# Patient Record
Sex: FEMALE | Race: BLACK OR AFRICAN AMERICAN | NOT HISPANIC OR LATINO | Employment: STUDENT | URBAN - METROPOLITAN AREA
[De-identification: names, ages, dates, MRNs, and addresses within clinical notes are randomized per-mention and may not be internally consistent; named-entity substitution may affect disease eponyms.]

---

## 2018-04-03 ENCOUNTER — TELEPHONE (OUTPATIENT)
Dept: OBSTETRICS AND GYNECOLOGY | Facility: CLINIC | Age: 28
End: 2018-04-03

## 2018-04-03 NOTE — TELEPHONE ENCOUNTER
----- Message from Kate Dominguez sent at 4/3/2018  2:41 PM CDT -----    Patient name:xiang      Reason:pt. Would like to establish care with dr. Alonzo. Pt. Would like to schedule first initial ob visit.[facility] 02/23/18. Please call to discuss      Callback number:[phone]

## 2018-04-03 NOTE — TELEPHONE ENCOUNTER
Pt was informed that we are not excepting any new ob at this time and we can get her scheduled at the [facility]

## 2018-11-21 LAB — HIV1/HIV2 ANTIBODY: NEGATIVE

## 2020-03-20 ENCOUNTER — PATIENT OUTREACH (OUTPATIENT)
Dept: ADMINISTRATIVE | Facility: HOSPITAL | Age: 30
End: 2020-03-20

## 2020-03-20 NOTE — PROGRESS NOTES
Pre-visit chart review completed.  Immunizations reviewed and updated.    Patient due for the following    Lipid Panel     HIV Screening     TETANUS VACCINE     Pap Smear with HPV Cotest     Influenza Vaccine (1)      Patient new to provider.

## 2020-05-29 ENCOUNTER — PATIENT OUTREACH (OUTPATIENT)
Dept: ADMINISTRATIVE | Facility: HOSPITAL | Age: 30
End: 2020-05-29

## 2020-05-29 NOTE — PROGRESS NOTES
Pre-visit chart review completed.  Immunizations reviewed and updated.    Patient due for the following    Lipid Panel     TETANUS VACCINE     Pap Smear with HPV Cotest       Patient new to provider.

## 2020-06-12 ENCOUNTER — LAB VISIT (OUTPATIENT)
Dept: LAB | Facility: HOSPITAL | Age: 30
End: 2020-06-12
Attending: INTERNAL MEDICINE
Payer: COMMERCIAL

## 2020-06-12 ENCOUNTER — OFFICE VISIT (OUTPATIENT)
Dept: PRIMARY CARE CLINIC | Facility: CLINIC | Age: 30
End: 2020-06-12
Payer: COMMERCIAL

## 2020-06-12 VITALS
HEIGHT: 68 IN | OXYGEN SATURATION: 99 % | DIASTOLIC BLOOD PRESSURE: 70 MMHG | HEART RATE: 60 BPM | RESPIRATION RATE: 19 BRPM | WEIGHT: 169.56 LBS | BODY MASS INDEX: 25.7 KG/M2 | TEMPERATURE: 98 F | SYSTOLIC BLOOD PRESSURE: 121 MMHG

## 2020-06-12 DIAGNOSIS — Z91.018 FOOD ALLERGY: ICD-10-CM

## 2020-06-12 DIAGNOSIS — Z11.59 SCREENING FOR VIRAL DISEASE: ICD-10-CM

## 2020-06-12 DIAGNOSIS — J30.2 SEASONAL ALLERGIC RHINITIS, UNSPECIFIED TRIGGER: ICD-10-CM

## 2020-06-12 DIAGNOSIS — Z00.00 ROUTINE MEDICAL EXAM: Primary | ICD-10-CM

## 2020-06-12 DIAGNOSIS — J45.20 MILD INTERMITTENT ASTHMA WITHOUT COMPLICATION: ICD-10-CM

## 2020-06-12 DIAGNOSIS — Z00.00 ROUTINE MEDICAL EXAM: ICD-10-CM

## 2020-06-12 DIAGNOSIS — E28.2 PCOS (POLYCYSTIC OVARIAN SYNDROME): ICD-10-CM

## 2020-06-12 LAB
ALBUMIN SERPL BCP-MCNC: 3.9 G/DL (ref 3.5–5.2)
ALP SERPL-CCNC: 56 U/L (ref 55–135)
ALT SERPL W/O P-5'-P-CCNC: 13 U/L (ref 10–44)
ANION GAP SERPL CALC-SCNC: 9 MMOL/L (ref 8–16)
AST SERPL-CCNC: 20 U/L (ref 10–40)
BASOPHILS # BLD AUTO: 0.03 K/UL (ref 0–0.2)
BASOPHILS NFR BLD: 0.5 % (ref 0–1.9)
BILIRUB SERPL-MCNC: 0.2 MG/DL (ref 0.1–1)
BUN SERPL-MCNC: 8 MG/DL (ref 6–20)
CALCIUM SERPL-MCNC: 9.2 MG/DL (ref 8.7–10.5)
CHLORIDE SERPL-SCNC: 107 MMOL/L (ref 95–110)
CHOLEST SERPL-MCNC: 195 MG/DL (ref 120–199)
CO2 SERPL-SCNC: 24 MMOL/L (ref 23–29)
CREAT SERPL-MCNC: 0.9 MG/DL (ref 0.5–1.4)
DIFFERENTIAL METHOD: ABNORMAL
EOSINOPHIL # BLD AUTO: 0.3 K/UL (ref 0–0.5)
EOSINOPHIL NFR BLD: 5 % (ref 0–8)
ERYTHROCYTE [DISTWIDTH] IN BLOOD BY AUTOMATED COUNT: 12.3 % (ref 11.5–14.5)
EST. GFR  (AFRICAN AMERICAN): >60 ML/MIN/1.73 M^2
EST. GFR  (NON AFRICAN AMERICAN): >60 ML/MIN/1.73 M^2
GLUCOSE SERPL-MCNC: 76 MG/DL (ref 70–110)
HCT VFR BLD AUTO: 37.5 % (ref 37–48.5)
HGB BLD-MCNC: 11.7 G/DL (ref 12–16)
IMM GRANULOCYTES # BLD AUTO: 0.02 K/UL (ref 0–0.04)
IMM GRANULOCYTES NFR BLD AUTO: 0.3 % (ref 0–0.5)
LYMPHOCYTES # BLD AUTO: 1.9 K/UL (ref 1–4.8)
LYMPHOCYTES NFR BLD: 30.3 % (ref 18–48)
MCH RBC QN AUTO: 28.2 PG (ref 27–31)
MCHC RBC AUTO-ENTMCNC: 31.2 G/DL (ref 32–36)
MCV RBC AUTO: 90 FL (ref 82–98)
MONOCYTES # BLD AUTO: 0.4 K/UL (ref 0.3–1)
MONOCYTES NFR BLD: 6.4 % (ref 4–15)
NEUTROPHILS # BLD AUTO: 3.6 K/UL (ref 1.8–7.7)
NEUTROPHILS NFR BLD: 57.5 % (ref 38–73)
NRBC BLD-RTO: 0 /100 WBC
PLATELET # BLD AUTO: 241 K/UL (ref 150–350)
PMV BLD AUTO: 10.7 FL (ref 9.2–12.9)
POTASSIUM SERPL-SCNC: 4 MMOL/L (ref 3.5–5.1)
PROT SERPL-MCNC: 8.1 G/DL (ref 6–8.4)
RBC # BLD AUTO: 4.15 M/UL (ref 4–5.4)
SODIUM SERPL-SCNC: 140 MMOL/L (ref 136–145)
WBC # BLD AUTO: 6.21 K/UL (ref 3.9–12.7)

## 2020-06-12 PROCEDURE — 99999 PR PBB SHADOW E&M-EST. PATIENT-LVL V: CPT | Mod: PBBFAC,,, | Performed by: INTERNAL MEDICINE

## 2020-06-12 PROCEDURE — 99999 PR PBB SHADOW E&M-EST. PATIENT-LVL V: ICD-10-PCS | Mod: PBBFAC,,, | Performed by: INTERNAL MEDICINE

## 2020-06-12 PROCEDURE — 99385 PREV VISIT NEW AGE 18-39: CPT | Mod: S$GLB,,, | Performed by: INTERNAL MEDICINE

## 2020-06-12 PROCEDURE — 99385 PR PREVENTIVE VISIT,NEW,18-39: ICD-10-PCS | Mod: S$GLB,,, | Performed by: INTERNAL MEDICINE

## 2020-06-12 PROCEDURE — 85025 COMPLETE CBC W/AUTO DIFF WBC: CPT

## 2020-06-12 PROCEDURE — 80053 COMPREHEN METABOLIC PANEL: CPT

## 2020-06-12 PROCEDURE — 36415 COLL VENOUS BLD VENIPUNCTURE: CPT | Mod: PN

## 2020-06-12 PROCEDURE — 82465 ASSAY BLD/SERUM CHOLESTEROL: CPT

## 2020-06-12 PROCEDURE — 86769 SARS-COV-2 COVID-19 ANTIBODY: CPT

## 2020-06-12 RX ORDER — CETIRIZINE HYDROCHLORIDE 5 MG/1
5 TABLET ORAL DAILY
COMMUNITY
End: 2020-06-12 | Stop reason: ALTCHOICE

## 2020-06-12 RX ORDER — EPINEPHRINE 0.3 MG/.3ML
1 INJECTION SUBCUTANEOUS ONCE AS NEEDED
Qty: 2 EACH | Refills: 3 | Status: SHIPPED | OUTPATIENT
Start: 2020-06-12 | End: 2023-08-28 | Stop reason: SDUPTHER

## 2020-06-12 RX ORDER — ALBUTEROL SULFATE 90 UG/1
2 AEROSOL, METERED RESPIRATORY (INHALATION) EVERY 6 HOURS PRN
Qty: 18 G | Refills: 11 | Status: SHIPPED | OUTPATIENT
Start: 2020-06-12

## 2020-06-12 RX ORDER — LEVOCETIRIZINE DIHYDROCHLORIDE 5 MG/1
5 TABLET, FILM COATED ORAL NIGHTLY
Qty: 30 TABLET | Refills: 11 | Status: SHIPPED | OUTPATIENT
Start: 2020-06-12 | End: 2023-02-20

## 2020-06-13 LAB — SARS-COV-2 IGG SERPLBLD QL IA.RAPID: NEGATIVE

## 2020-06-14 NOTE — PROGRESS NOTES
Subjective:       Patient ID: Evelia Ramon is a 29 y.o. female.    Chief Complaint: Establish Care and Annual Exam    She is new to the clinic. Last seen by a local PCP outside [facility] over 2 yrs ago. Presents to establish care, for annual exam. No acute complaints.     PMH: .  Asthma, mild intermittent.  Seasonal Allergic Rhinitis.   Eczema.  PCOS.  H/O Anemia.   Pelvic exam 2018.     PSH: [place] teeth extracted. Cervical Cerclage.     Social: Non-smoker, no alcohol.  with 18 month old daughter. . Regular diet, caffeine about once a week, exercises daily - weight training and cardio.     FMH: HTN, DM, Arthritis, Osteoporosis, Dementia, Bone cancer.    Allergies: Latex, Nuts.     Medications: OCP's, Albuterol prn (about once a month), Zyrtec prn, Epipen, Multivitamin.    Review of Systems   Constitutional: Negative for activity change, appetite change, fatigue, fever and unexpected weight change.   HENT: Positive for rhinorrhea and sneezing. Negative for congestion, hearing loss, sinus pressure, sinus pain, sore throat, trouble swallowing and voice change.    Eyes: Negative for pain and visual disturbance.   Respiratory: Negative for cough, chest tightness and shortness of breath.         Asthma is mild, but worse in the past two years. Moved here from [place] six years ago.    Cardiovascular: Negative for chest pain, palpitations and leg swelling.   Gastrointestinal: Negative for abdominal pain, blood in stool, constipation, diarrhea, nausea and vomiting.   Genitourinary: Negative for difficulty urinating, dysuria, flank pain, frequency, hematuria, menstrual problem and urgency.   Musculoskeletal: Negative for arthralgias, back pain, joint swelling, myalgias and neck pain.   Skin: Negative for color change and rash.        No active eczema at present.   Neurological: Negative for dizziness, syncope, facial asymmetry, speech difficulty, weakness, numbness and headaches.  "  Hematological: Negative for adenopathy. Does not bruise/bleed easily.   Psychiatric/Behavioral: Negative for agitation, dysphoric mood and sleep disturbance. The patient is not nervous/anxious.        Objective:    /70, Pulse 60, Temp 98, O2 Sat 99%, Ht 5' 7.5", Wt 169.5 lbs, BMI=26  Physical Exam  Vitals signs reviewed.   Constitutional:       General: She is not in acute distress.     Appearance: Normal appearance. She is well-developed and normal weight. She is not diaphoretic.   HENT:      Head: Normocephalic and atraumatic.      Right Ear: Tympanic membrane, ear canal and external ear normal.      Left Ear: Tympanic membrane, ear canal and external ear normal.      Nose: Nose normal.      Mouth/Throat:      Mouth: Mucous membranes are moist.   Eyes:      General: No scleral icterus.     Conjunctiva/sclera: Conjunctivae normal.      Right eye: Right conjunctiva is not injected.      Left eye: Left conjunctiva is not injected.      Pupils: Pupils are equal, round, and reactive to light.   Neck:      Musculoskeletal: Normal range of motion and neck supple.      Thyroid: No thyromegaly.      Vascular: No carotid bruit or JVD.   Cardiovascular:      Rate and Rhythm: Normal rate and regular rhythm.      Heart sounds: Normal heart sounds. No murmur. No friction rub. No gallop.    Pulmonary:      Effort: Pulmonary effort is normal. No respiratory distress.      Breath sounds: Normal breath sounds. No wheezing, rhonchi or rales.   Abdominal:      General: Bowel sounds are normal. There is no distension.      Palpations: Abdomen is soft. There is no mass.      Tenderness: There is no abdominal tenderness.   Musculoskeletal: Normal range of motion.         General: No tenderness or deformity.   Lymphadenopathy:      Cervical: No cervical adenopathy.   Skin:     General: Skin is warm and dry.      Coloration: Skin is not pale.      Findings: No erythema, lesion or rash.      Nails: There is no clubbing. "     Neurological:      Mental Status: She is alert and oriented to person, place, and time.      Cranial Nerves: No cranial nerve deficit.      Motor: No abnormal muscle tone.      Coordination: Coordination normal.      Gait: Gait normal.      Deep Tendon Reflexes: Reflexes are normal and symmetric.   Psychiatric:         Behavior: Behavior normal.         Thought Content: Thought content normal.         Judgment: Judgment normal.         Assessment:       1. Routine medical exam    2. Mild intermittent asthma without complication    3. Seasonal allergic rhinitis, unspecified trigger    4. Food allergy    5. PCOS (polycystic ovarian syndrome)    6. Screening for viral disease        Plan:       Routine medical exam  -     CBC auto differential; Future; Expected date: 06/12/2020  -     Comprehensive metabolic panel; Future; Expected date: 06/12/2020  -     Cholesterol, total; Future; Expected date: 06/12/2020    Mild intermittent asthma without complication  -     albuterol (PROAIR HFA) 90 mcg/actuation inhaler; Inhale 2 puffs into the lungs every 6 (six) hours as needed for Wheezing. Rescue  Dispense: 18 g; Refill: 11    Seasonal allergic rhinitis, unspecified trigger  -     Change Zyrtec to Levocetirizine (XYZAL) 5 MG tablet; Take 1 tablet (5 mg total) by mouth every evening. For Allergies.  Dispense: 30 tablet; Refill: 11    Food allergy  -     EPINEPHrine (EPIPEN 2-DELVIS) 0.3 mg/0.3 mL AtIn; Inject 0.3 mLs (0.3 mg total) into the muscle once as needed.  Dispense: 2 each; Refill: 3    PCOS (polycystic ovarian syndrome)  -     Ambulatory referral/consult to Gynecology; Future; Expected date: 06/19/2020    Screening for viral disease  -     COVID-19 (SARS CoV-2) IgG Antibody; Future; Expected date: 06/12/2020

## 2020-09-18 ENCOUNTER — TELEPHONE (OUTPATIENT)
Dept: PRIMARY CARE CLINIC | Facility: CLINIC | Age: 30
End: 2020-09-18

## 2020-09-25 ENCOUNTER — LAB VISIT (OUTPATIENT)
Dept: LAB | Facility: HOSPITAL | Age: 30
End: 2020-09-25
Attending: INTERNAL MEDICINE
Payer: COMMERCIAL

## 2020-09-25 DIAGNOSIS — D64.9 LOW HEMOGLOBIN: ICD-10-CM

## 2020-09-25 LAB
BASOPHILS # BLD AUTO: 0.03 K/UL (ref 0–0.2)
BASOPHILS NFR BLD: 0.5 % (ref 0–1.9)
DIFFERENTIAL METHOD: ABNORMAL
EOSINOPHIL # BLD AUTO: 0.3 K/UL (ref 0–0.5)
EOSINOPHIL NFR BLD: 5.1 % (ref 0–8)
ERYTHROCYTE [DISTWIDTH] IN BLOOD BY AUTOMATED COUNT: 12.4 % (ref 11.5–14.5)
HCT VFR BLD AUTO: 41.7 % (ref 37–48.5)
HGB BLD-MCNC: 12.7 G/DL (ref 12–16)
IMM GRANULOCYTES # BLD AUTO: 0.01 K/UL (ref 0–0.04)
IMM GRANULOCYTES NFR BLD AUTO: 0.2 % (ref 0–0.5)
LYMPHOCYTES # BLD AUTO: 1.4 K/UL (ref 1–4.8)
LYMPHOCYTES NFR BLD: 24.6 % (ref 18–48)
MCH RBC QN AUTO: 28.3 PG (ref 27–31)
MCHC RBC AUTO-ENTMCNC: 30.5 G/DL (ref 32–36)
MCV RBC AUTO: 93 FL (ref 82–98)
MONOCYTES # BLD AUTO: 0.4 K/UL (ref 0.3–1)
MONOCYTES NFR BLD: 6.5 % (ref 4–15)
NEUTROPHILS # BLD AUTO: 3.7 K/UL (ref 1.8–7.7)
NEUTROPHILS NFR BLD: 63.1 % (ref 38–73)
NRBC BLD-RTO: 0 /100 WBC
PLATELET # BLD AUTO: 256 K/UL (ref 150–350)
PMV BLD AUTO: 10.9 FL (ref 9.2–12.9)
RBC # BLD AUTO: 4.48 M/UL (ref 4–5.4)
WBC # BLD AUTO: 5.86 K/UL (ref 3.9–12.7)

## 2020-09-25 PROCEDURE — 36415 COLL VENOUS BLD VENIPUNCTURE: CPT | Mod: PN

## 2020-09-25 PROCEDURE — 85025 COMPLETE CBC W/AUTO DIFF WBC: CPT

## 2020-09-27 ENCOUNTER — PATIENT MESSAGE (OUTPATIENT)
Dept: PRIMARY CARE CLINIC | Facility: CLINIC | Age: 30
End: 2020-09-27

## 2020-10-09 ENCOUNTER — PATIENT MESSAGE (OUTPATIENT)
Dept: ADMINISTRATIVE | Facility: HOSPITAL | Age: 30
End: 2020-10-09

## 2021-04-26 ENCOUNTER — PATIENT MESSAGE (OUTPATIENT)
Dept: RESEARCH | Facility: HOSPITAL | Age: 31
End: 2021-04-26

## 2021-07-06 ENCOUNTER — PATIENT MESSAGE (OUTPATIENT)
Dept: ADMINISTRATIVE | Facility: HOSPITAL | Age: 31
End: 2021-07-06

## 2021-12-29 ENCOUNTER — TELEPHONE (OUTPATIENT)
Dept: PRIMARY CARE CLINIC | Facility: CLINIC | Age: 31
End: 2021-12-29
Payer: MEDICAID

## 2022-01-18 ENCOUNTER — PATIENT MESSAGE (OUTPATIENT)
Dept: ADMINISTRATIVE | Facility: HOSPITAL | Age: 32
End: 2022-01-18
Payer: MEDICAID

## 2022-06-14 ENCOUNTER — CLINICAL SUPPORT (OUTPATIENT)
Dept: REHABILITATION | Facility: HOSPITAL | Age: 32
End: 2022-06-14
Attending: OBSTETRICS & GYNECOLOGY
Payer: MEDICAID

## 2022-06-14 DIAGNOSIS — M62.89 PELVIC FLOOR TENSION: ICD-10-CM

## 2022-06-14 DIAGNOSIS — R10.2 PELVIC PAIN: ICD-10-CM

## 2022-06-14 DIAGNOSIS — R27.8 COORDINATION IMPAIRMENT: ICD-10-CM

## 2022-06-14 PROCEDURE — 97112 NEUROMUSCULAR REEDUCATION: CPT | Mod: PN

## 2022-06-14 PROCEDURE — 97161 PT EVAL LOW COMPLEX 20 MIN: CPT | Mod: PN

## 2022-06-14 PROCEDURE — 97530 THERAPEUTIC ACTIVITIES: CPT | Mod: PN

## 2022-06-14 NOTE — PATIENT INSTRUCTIONS
([employer])        To isolate layer 1: think about opening around your vagina as you inhale      To isolate layer 2: imagine letting your urethra drop away from you as you inhale      To isolate layer 3: relax at your tailbone to let it expand down and backward as you inhale         So when practicing this at home: Complete 1 minute at each layer 1-2 times per day  Inhale and let pelvic floor muscles expand. Do not push muscles down!  Exhale and let pelvic floor muscles relax. Do not contract!

## 2022-06-17 PROBLEM — R10.2 PELVIC PAIN: Status: ACTIVE | Noted: 2022-06-17

## 2022-06-20 PROBLEM — M62.89 PELVIC FLOOR TENSION: Status: ACTIVE | Noted: 2022-06-20

## 2022-06-20 PROBLEM — R27.8 COORDINATION IMPAIRMENT: Status: ACTIVE | Noted: 2022-06-20

## 2022-06-20 NOTE — PLAN OF CARE
[facility]  Pelvic Health Physical Therapy Initial Evaluation    Date: 2022   Name: Evelia Ramon  [facility] Number: [id number]  Therapy Diagnosis:   Encounter Diagnoses   Name Primary?    Pelvic pain     Pelvic floor tension     Coordination impairment      Physician: Tito Palmer MD    Physician Orders: PT Eval and Treat - Pelvic Floor PT   Medical Diagnosis from Referral: R10.2 (ICD-10-CM) - Pelvic pain in female  Evaluation Date: 2022  Authorization Period Expiration: 2022  Plan of Care Expiration: 2022  Visit # / Visits authorized:     Time In: 1708 (pt arrived late)  Time Out: 1800  Total Appointment Time (timed & untimed codes): 52 minutes  Total Billing Time: 29    Precautions: universal    Subjective     Date of onset: Mid      History of current condition - Evelia reports: Pelvic pain mostly centralized to perineum but also radiates to clitoral region; described as dull, ache type pain.  Pain began 3rd trimester of most recent pregnancy; delivered Aug 2021. Started pelvic floor PT with [facility]; was given exercises to contract pelvic floor. Felt like these may have helped some, but did not return to therapy because she did not like the therapist she was seeing and was unable to transfer care to another PT. Difficulty with exercise initially, though this has improved. Widespread pelvic pain as well as L sided buttock pain that began yesterday. Also experiences UUI that began post partum; comes and goes.  Denies chronic constipation, though she will experience if she does not drink enough water.      OB/GYN History:   x1   Sexually active? Yes  Pain with vaginal exams, intercourse or tampon use? Yes - exams     Bladder/Bowel History:    Frequency of urination:   Daytime: Every 2-3 hrs           Nighttime: 0    Difficulty initiating urine stream: No   Urine stream: strong   Complete emptying: Yes   Bladder leakage: Yes   Frequency of incidents: 3+  x/week   Amount leaked (urine): teaspoon(s)   Urinary Urgency: Yes  Able to delay the urge for at least 00 minute(s).   Frequency of bowel movements: once every 2 days   Difficulty initiating BM: Yes, if she does not drink enough water   Quality/Shape of BM: [place] Stool Chart Type 4    Does Patient Feel Empty after BM? Yes   Fiber Supplements or Laxative Use?  Occasional laxative use (every 3 months or so)   Colon leakage: No   Form of protection: panty liner   Number of pads required in 24 hours: 0-1    PAIN:  Location: perineum, clitoral region, L ischial tuberosity  Current 3/10, worst 6/10, best 0/10   Description: Aching and Dull  Aggravating Factors/Activities that cause symptoms: sitting   Easing Factors: lying down, reclining, movement (walking)      Medical History: Evelia  has a past medical history of Allergy, Asthma, Eczema, and PCOS (polycystic ovarian syndrome).     Surgical History:  Evelia Ramon  has a past surgical history that includes [place] tooth extraction.    Medications: Evelia has a current medication list which includes the following prescription(s): albuterol, epinephrine, levocetirizine, multivit with calcium,iron,min, and norgestimate-ethinyl estradiol.    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  Prior Level of Function: independent   Current Level of Function: urgency and incontinence interrupt daily activities, pain limits pelvic exams and interrupts work duties     Types of fluid intake: 4x18+ oz water, 1 glass of milk   Diet: whole foods    Fiber: 1-2x vegetables, 1-2x fruit, whole grains, allergic to nuts,  beans 1x/wk  Habitus:well developed, well nourished  Abuse/Neglect: No     Pts goals: improve pain and bladder control     OBJECTIVE     See EMR under MEDIA for written consent provided 6/14/2022  Chaperone: declined    ORTHO SCREEN  Posture in sitting: WNL  Posture in standing: WNL  Pelvic alignment: WNL   SI Joint Palpation: WNL  Adductor Palpation: WNL    LUMBAR ROM - WFL    HIP STRENGTH: 5/5         Hip and SIJ Special Tests:    MANN Test: (-)     Hip Quadrant Test: (-)      FAIR Test: (-)       Special Tests for Load Transfer Assessment Between the Trunk and Lower Extremities:     Single Leg Stance Test:  (-)      ABDOMINAL WALL ASSESSMENT  Palpation: increased tension  and denies tenderness  Abdominal strength: Rectus abdominus: NP      Transverse abdominus: NP   Pelvic Floor Muscle and Transverse Abdominus Synergy: present    BREATHING MECHANICS ASSESSMENT   Thorax Assessment During Quiet Respiration: WNL excursion of ribcage and WNL excursion of abdominal wall  Thorax Assessment During Deep Respiration: WNL excursion of ribcage and Decreased excursion of abdominal wall     VAGINAL PELVIC FLOOR EXAM    EXTERNAL ASSESSMENT  Sensation: WNL   Pain: tenderness at L bulbospongiosus, L ischial tuberosity   Voluntary contraction: slight palpable lift  Voluntary relaxation: slight palpable drop  Bearing down: slight palpable bulge     Quality of contraction: slowed, incomplete relaxation    Specificity: WNL  Coordination: WNL  Comments: performed external to clothing    Limitation/Restriction for FOTO Pelvic Floor Survey - NP this session     TREATMENT     Treatment Time In: 1731  Treatment Time Out: 1800  Total Treatment time (time-based codes) separate from Evaluation: 29 minutes    Neuromuscular Re-education to develop control, coordination, down training for 17 minutes including: diaphragmatic breathing, pelvic floor muscle relaxation     Therapeutic Activity Patient participated in dynamic functional  therapeutic activities to improve functional performance for 12 minutes. Including: Education as described below.     Patient Education provided:   general anatomy/physiology of urinary/ bowel system, benefits of treatment, risks of treatment, and alternative methods of treatment were discussed with the pt. Additionally, pelvic floor anatomy, bladder irritants, diaphragmatic breathing, postural considerations were reviewed.     Home Exercises Provided:  Written Home Exercises Provided: yes.  Exercises were reviewed and Evelia was able to demonstrate them prior to the end of the session.    Evelia demonstrated good understanding of the education provided.     See EMR under Patient Instructions for exercises provided 6/14/2022.    Assessment     Evelia is a 31 y.o. female referred to outpatient Physical Therapy with a medical diagnosis of pelvic pain. Pt presents with  good trunk mobility and strength, good LE strength and fair LE flexibility. However, abdominal assessment revealed slight tension, with mobility restricted during breathing; no tenderness reported. Complete pelvic assessment not performed, though external pelvic assessment revealed tenderness and limited mobility. Slight decrease in discomfort noted following review of pelvic drops. Together, deficits have resulted in pain as well as poor bladder control. Based on presentation, Evelia would benefit from continued pelvic floor physical therapy to improve abdominopelvic muscle balance.    Pt prognosis is Good.   Pt will benefit from skilled outpatient Physical Therapy to address the deficits stated above and in the chart below, provide pt/family education, and to maximize pt's level of independence.     Plan of care discussed with patient: Yes  Pt's spiritual, cultural and educational needs considered and patient is agreeable to the plan of care and goals as stated below:       Anticipated Barriers for therapy: Chronicity of  condition    Medical Necessity is demonstrated by the following    History  Co-morbidities and personal factors that may impact the plan of care Co-morbidities:   None on file     Personal Factors:   no deficits     low   Examination  Body Structures and Functions, activity limitations and participation restrictions that may impact the plan of care Body Regions/Systems/Functions:  pelvic floor tenderness, increased tension of the pelvic muscles and poor coordination of pelvic floor muscles during ADL's    Activity Limitations:  incontinence with ADLs and Pain with ADLs    Participation Restrictions:  all ADLs/iADLs uninterrupted by urinary incontinence/urgency/frequency, well woman's exam, ADL participation affected by pain, work duties and exercise restrictions due to pain     Activity limitations:   Learning and applying knowledge  no deficits    General Tasks and Commands  no deficits    Communication  no deficits    Mobility  no deficits    Self care  no deficits    Domestic Life  no deficits    Interactions/Relationships  no deficits    Life Areas  employment    Community and Social Life  community life  recreation and leisure       low   Clinical Presentation stable and uncomplicated low   Decision Making/ Complexity Score: low       Goals:  Short Term Goals: 4 weeks   Pt to demonstrate being able to correctly and consistently perform a kegel which is needed  to increase pelvic floor muscle coordination and strength needed for continence.  Pt to be able to delay the urge to urinate at least 10 minutes with a strong urge to urinate in order to make it to the bathroom without leaking.  Pt to demonstrate proper diaphragmatic breathing to help with calming the nervous system in order to decrease pain and to improve abdominal wall musculature extensibility.   Pt to report being able to complete a half day at work without significant increase in pain to demonstrate a return towards prior level of function.  Pt to  demonstrate good body mechanics when performing ADLs such as lifting and bending to decrease strain to lumbopelvic structures and reduce risk of further injury.  Pt will report minimal to no pain with single digit pelvic assessment and display relaxation during this assessment in order to progress to dilator use.      Long Term Goals: 8 weeks   Pt to report elimination of incontinence with ADLs to demonstrate improved pelvic floor muscle strength and coordination.  Pt to be able to delay the urge to urinate at least 20 minutes with a strong urge to urinate in order to make it to the bathroom without leaking.  Pt to be discharged with home plan for carry over after discharge.   Pt will be trained and compliant with postural strategies in sitting and standing to improve alignment and decrease pain and muscle fatigue  Pt to report being able to complete a full day at work without significant increase in pain to demonstrate a return towards prior level of function.  Pt to report being able to have a comfortable vaginal exam without significant increase in pelvic pain.        Plan     Plan of care Certification: 6/14/2022 to 9/14/2022.    Outpatient Physical Therapy 1 times weekly for 10 weeks to include the following interventions: therapeutic exercises, therapeutic activity, neuromuscular re-education, manual therapy, modalities PRN, patient/family education and self care/home management    Plan for next visit: pelvic floor assessment     MARIE JOSHI, PT  6/14/2022        I have seen the patient, reviewed the therapist's plan of care, and I agree with the plan of care.      I certify the need for these services furnished under this plan of treatment and while under my care.     ___________________ ________ Physician/Referring Practitioner            ___________________________ Date of Signature

## 2022-06-23 ENCOUNTER — CLINICAL SUPPORT (OUTPATIENT)
Dept: REHABILITATION | Facility: HOSPITAL | Age: 32
End: 2022-06-23
Attending: OBSTETRICS & GYNECOLOGY
Payer: MEDICAID

## 2022-06-23 DIAGNOSIS — M62.89 PELVIC FLOOR TENSION: ICD-10-CM

## 2022-06-23 DIAGNOSIS — R10.2 PELVIC PAIN: Primary | ICD-10-CM

## 2022-06-23 PROCEDURE — 97140 MANUAL THERAPY 1/> REGIONS: CPT | Mod: PN

## 2022-06-23 PROCEDURE — 97530 THERAPEUTIC ACTIVITIES: CPT | Mod: PN

## 2022-06-23 PROCEDURE — 97112 NEUROMUSCULAR REEDUCATION: CPT | Mod: PN

## 2022-06-23 NOTE — PLAN OF CARE
([email or web address])     Relaxation      To isolate layer 1: think about opening around your vagina as you inhale      To isolate layer 2: imagine letting your urethra drop away from you as you inhale      To isolate layer 3: imagine your sit bones widening as you inhale         So when practicing this at home: Complete 1 minute at each layer 2-3 times per day  1. Inhale and let pelvic floor muscles expand. Do not push muscles down!  2. Exhale and let pelvic floor muscles relax. Do not contract!     Try for 1+ minute after exercising. Can try in sitting while leaning forward with elbows on knees.

## 2022-06-23 NOTE — PATIENT INSTRUCTIONS
([email or web address])     Relaxation      To isolate layer 1: think about opening around your vagina as you inhale      To isolate layer 2: imagine letting your urethra drop away from you as you inhale      To isolate layer 3: imagine your sit bones widening as you inhale         So when practicing this at home: Complete 1 minute at each layer 2-3 times per day  Inhale and let pelvic floor muscles expand. Do not push muscles down!  Exhale and let pelvic floor muscles relax. Do not contract!     Try for 1+ minute after exercising. Can try in sitting while leaning forward with elbows on knees.

## 2022-06-23 NOTE — PROGRESS NOTES
Pelvic Health Physical Therapy   Treatment Note     Name: Evelia [facility] Number: [id number]    Therapy Diagnosis:   Encounter Diagnoses   Name Primary?    Pelvic pain Yes    Pelvic floor tension      Physician: Tito Palmer MD    Visit Date: 6/23/2022    Physician Orders: PT Eval and Treat - Pelvic Floor PT   Medical Diagnosis from Referral: R10.2 (ICD-10-CM) - Pelvic pain in female  Evaluation Date: 6/14/2022  Authorization Period Expiration: 6/29/2023  Plan of Care Expiration: 9/14/2022  Visit # / Visits authorized: 1/ 20    Cancelled Visits: 0   No Show Visits: 0     Time In: 1104  Time Out: 1202  Total Billable Time: 58 minutes    Precautions: Standard    Subjective     Pt reports: Has been using breathing about 3 times per day. Has been concentrating on relaxing tension, especially in abdomen. Dull, ache pain in pelvis for approx. 1 hour at a time. Happened 2 times, but she is unsure of what caused it.     She was compliant with home exercise program.  Response to previous treatment: Good   Functional change: Ongoing     Pain: 3/10  Location: perineum, clitoral region, L ischial tuberosity    Objective     Evelia received therapeutic exercises to develop  strength, endurance, ROM, flexibility, posture and core stabilization for 00 minutes including:     Evelia received the following manual therapy techniques: to develop extensibility and desensitization for 20 minutes including: trigger point/myofascial release of B transverse perineal, bulbospongiosus, LA     SIP + DB/drops in supine    Progressive Hip IR >> ER     Evelia participated in neuromuscular re-education activities to develop Coordination, Control and Down training for 28 minutes including: pelvic floor relaxation/bulging training, 360 breathing  and diaphragmatic breathing    DB review   Drop review - in supine, sitting, mod quad, child's pose    Complete vs layer specific     Evelia participated in dynamic functional  therapeutic activities to improve functional performance for 10  minutes, including: Education as described below       Potential modifications to HEP     Home Exercises Provided and Patient Education Provided     Education provided:   - anatomy/physiology of pelvic floor, posture/body mechanices, bladder retraining, diaphragmatic breathing and behavior modifications  Discussed progression of plan of care with patient; educated pt in activity modification; reviewed HEP with pt. Pt demonstrated and verbalized understanding of all instruction and was provided with a handout of HEP (see Patient Instructions).    Written Home Exercises Provided: yes.  Exercises were reviewed and Evelia was able to demonstrate them prior to the end of the session.  Evelia demonstrated good  understanding of the education provided.     See EMR under Patient Instructions for exercises provided 6/23/2022.    Assessment     Evelia performed well in today's session. Breathing reviewed, with good abdominal mobility and coordination demonstrated. Slight tenderness reported at LLQ, likely secondary to PFM. Pelvic assessment performed with tenderness and tension noted bilaterally near ischial tuberosities. This session, most tenderness noted on R side compared to L. Because of this, pelvic floor relaxation techniques reviewed, with focus on layers 1 and 3. Fair performance demonstrated, though only slight improvement in pain noted. 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 Based on performance, PT to review NV prior to progressing performance.    Evelia Is progressing well towards her goals.   Pt prognosis is Good.     Pt will continue to benefit from skilled outpatient physical therapy to address the deficits listed in the problem list box on initial evaluation, provide pt/family education and to maximize pt's level of independence in the home and community environment.     Pt's spiritual, cultural and educational needs  considered and pt agreeable to plan of care and goals.     Anticipated barriers to physical therapy: Chronicity of condition    Goals:   Short Term Goals: 4 weeks - progressing   Pt to demonstrate being able to correctly and consistently perform a kegel which is needed  to increase pelvic floor muscle coordination and strength needed for continence.  Pt to be able to delay the urge to urinate at least 10 minutes with a strong urge to urinate in order to make it to the bathroom without leaking.  Pt to demonstrate proper diaphragmatic breathing to help with calming the nervous system in order to decrease pain and to improve abdominal wall musculature extensibility.   Pt to report being able to complete a half day at work without significant increase in pain to demonstrate a return towards prior level of function.  Pt to demonstrate good body mechanics when performing ADLs such as lifting and bending to decrease strain to lumbopelvic structures and reduce risk of further injury.  Pt will report minimal to no pain with single digit pelvic assessment and display relaxation during this assessment in order to progress to dilator use.        Long Term Goals: 8 weeks  - progressing   Pt to report elimination of incontinence with ADLs to demonstrate improved pelvic floor muscle strength and coordination.  Pt to be able to delay the urge to urinate at least 20 minutes with a strong urge to urinate in order to make it to the bathroom without leaking.  Pt to be discharged with home plan for carry over after discharge.   Pt will be trained and compliant with postural strategies in sitting and standing to improve alignment and decrease pain and muscle fatigue  Pt to report being able to complete a full day at work without significant increase in pain to demonstrate a return towards prior level of function.  Pt to report being able to have a comfortable vaginal exam without significant increase in pelvic pain.    Plan     Plan of  care Certification: 6/14/2022 to 9/14/2022.    Plan for next visit: review drops and positions, reassess hip ER/IR, ADD/ABD     MARIE JOSHI, PT   06/27/2022

## 2022-07-07 ENCOUNTER — CLINICAL SUPPORT (OUTPATIENT)
Dept: REHABILITATION | Facility: HOSPITAL | Age: 32
End: 2022-07-07
Attending: OBSTETRICS & GYNECOLOGY
Payer: MEDICAID

## 2022-07-07 DIAGNOSIS — R10.2 PELVIC PAIN: Primary | ICD-10-CM

## 2022-07-07 DIAGNOSIS — M62.89 PELVIC FLOOR TENSION: ICD-10-CM

## 2022-07-07 PROCEDURE — 97530 THERAPEUTIC ACTIVITIES: CPT | Mod: PN

## 2022-07-07 PROCEDURE — 97112 NEUROMUSCULAR REEDUCATION: CPT | Mod: PN

## 2022-07-07 NOTE — PROGRESS NOTES
Pelvic Health Physical Therapy   Treatment Note     Name: Evelia [facility] Number: [id number]    Therapy Diagnosis:   Encounter Diagnoses   Name Primary?    Pelvic pain Yes    Pelvic floor tension      Physician: Tito Palmer MD    Visit Date: 7/7/2022    Physician Orders: PT Eval and Treat - Pelvic Floor PT   Medical Diagnosis from Referral: R10.2 (ICD-10-CM) - Pelvic pain in female  Evaluation Date: 6/14/2022  Authorization Period Expiration: 6/29/2023  Plan of Care Expiration: 9/14/2022  Visit # / Visits authorized: 2/ 20    Cancelled Visits: 0   No Show Visits: 0     Time In: 1002  Time Out: 1100  Total Billable Time: 58 minutes    Precautions: Standard    Subjective     Pt reports: Notices that discomfort comes on most in the evenings. Is able to use layer 3 relaxation and side lying for some relief. Has been unable to get abdominals to relax in modified quadruped positioning.     She was compliant with home exercise program.  Response to previous treatment: Good   Functional change: Ongoing     Pain: 3/10  Location: perineum, clitoral region, L ischial tuberosity    Objective     Evelia received therapeutic exercises to develop  strength, endurance, ROM, flexibility, posture and core stabilization for 00 minutes including:     Evelia received the following manual therapy techniques: to develop extensibility and desensitization for 00 minutes including: trigger point/myofascial release of B transverse perineal, bulbospongiosus, LA     SIP + DB/drops in supine    Progressive Hip IR >> ER     Evelia participated in neuromuscular re-education activities to develop Coordination, Control and Down training for 48 minutes including: pelvic floor relaxation/bulging training, 360 breathing  and diaphragmatic breathing    DB review - in supine, sitting, standing at wall, deep squat, + cat/cow   Drop review - in supine ( HL, happy baby), sitting, inverted at wall (HS, butterfly, mod happy baby),  deep quat   NP: mod quad, child's pose   Complete vs layer 3 specific        Evelia participated in dynamic functional therapeutic activities to improve functional performance for 10  minutes, including: Education as described below       Stretches for plane - PPT, Hip ABD iso, butterfly stretch   Use of lumbar pillow for plane     Home Exercises Provided and Patient Education Provided     Education provided:   - anatomy/physiology of pelvic floor, posture/body mechanices, bladder retraining, diaphragmatic breathing and behavior modifications  Discussed progression of plan of care with patient; educated pt in activity modification; reviewed HEP with pt. Pt demonstrated and verbalized understanding of all instruction and was provided with a handout of HEP (see Patient Instructions).    Written Home Exercises Provided: yes.  Exercises were reviewed and Evelia was able to demonstrate them prior to the end of the session.  Evelia demonstrated good  understanding of the education provided.     See EMR under Patient Instructions for exercises provided 6/23/2022.    Assessment     Evelia performed fairly well in today's session, presenting without pain. Because she reported difficulty with abdominal relaxation at home, review of breathing performed. Improved performance noted with use of straw breathing and focus on lower rib expansion. She also demonstrated good carry over to sitting when practicing initially in supine. Performance in standing against wall also performed well. Despite improvements in breathing, she continues with limited awareness of pelvic floor mobility in most positions and poor tolerance of end range hip positioning. Pelvic floor tension appears to significantly vary based on hip positioning. Butterfly positioning against wall allowed for most relaxation, while sitting with legs crossed against wall increased hip pain. Happy baby positioning in neutral rotation also increased hip pain. Hip  internal rotation in deep squat positioning made no change on hip. Pain presentation not immediate, but progresses with sustained positioning. Palpation of posterolateral hip unremarkable; pelvic drops with layer 3 focus resulted in slight reduction in hip symptoms. Based on performance, PT to consider reassessment of pelvic floor NV.     Evelia Is progressing well towards her goals.   Pt prognosis is Good.     Pt will continue to benefit from skilled outpatient physical therapy to address the deficits listed in the problem list box on initial evaluation, provide pt/family education and to maximize pt's level of independence in the home and community environment.     Pt's spiritual, cultural and educational needs considered and pt agreeable to plan of care and goals.     Anticipated barriers to physical therapy: Chronicity of condition    Goals:   Short Term Goals: 4 weeks - progressing   Pt to demonstrate being able to correctly and consistently perform a kegel which is needed  to increase pelvic floor muscle coordination and strength needed for continence.  Pt to be able to delay the urge to urinate at least 10 minutes with a strong urge to urinate in order to make it to the bathroom without leaking.  Pt to demonstrate proper diaphragmatic breathing to help with calming the nervous system in order to decrease pain and to improve abdominal wall musculature extensibility.   Pt to report being able to complete a half day at work without significant increase in pain to demonstrate a return towards prior level of function.  Pt to demonstrate good body mechanics when performing ADLs such as lifting and bending to decrease strain to lumbopelvic structures and reduce risk of further injury.  Pt will report minimal to no pain with single digit pelvic assessment and display relaxation during this assessment in order to progress to dilator use.        Long Term Goals: 8 weeks  - progressing   Pt to report elimination of  incontinence with ADLs to demonstrate improved pelvic floor muscle strength and coordination.  Pt to be able to delay the urge to urinate at least 20 minutes with a strong urge to urinate in order to make it to the bathroom without leaking.  Pt to be discharged with home plan for carry over after discharge.   Pt will be trained and compliant with postural strategies in sitting and standing to improve alignment and decrease pain and muscle fatigue  Pt to report being able to complete a full day at work without significant increase in pain to demonstrate a return towards prior level of function.  Pt to report being able to have a comfortable vaginal exam without significant increase in pelvic pain.    Plan     Plan of care Certification: 6/14/2022 to 9/14/2022.    Plan for next visit: review drops in hip ER/IR, ADD/ABD with internal reassessment     MARIE JOSHI, PT   07/07/2022

## 2022-09-29 ENCOUNTER — CLINICAL SUPPORT (OUTPATIENT)
Dept: REHABILITATION | Facility: HOSPITAL | Age: 32
End: 2022-09-29
Payer: MEDICAID

## 2022-09-29 DIAGNOSIS — R10.2 PELVIC PAIN: Primary | ICD-10-CM

## 2022-09-29 DIAGNOSIS — M62.89 PELVIC FLOOR TENSION: ICD-10-CM

## 2022-09-29 PROCEDURE — 97140 MANUAL THERAPY 1/> REGIONS: CPT | Mod: PN

## 2022-09-29 PROCEDURE — 97110 THERAPEUTIC EXERCISES: CPT | Mod: PN

## 2022-09-29 PROCEDURE — 97112 NEUROMUSCULAR REEDUCATION: CPT | Mod: PN

## 2022-09-29 PROCEDURE — 97530 THERAPEUTIC ACTIVITIES: CPT | Mod: PN

## 2022-09-29 NOTE — PROGRESS NOTES
Pelvic Health Physical Therapy   Treatment Note     Name: Evelia Ramon  [facility] Number: [id number]    Therapy Diagnosis:   Encounter Diagnoses   Name Primary?    Pelvic pain Yes    Pelvic floor tension        Physician: Tito Palmer MD    Visit Date: 9/29/2022    Physician Orders: PT Eval and Treat - Pelvic Floor PT   Medical Diagnosis from Referral: R10.2 (ICD-10-CM) - Pelvic pain in female  Evaluation Date: 6/14/2022  Authorization Period Expiration: 6/29/2023  Plan of Care Expiration: 12/29/2022  Visit # / Visits authorized: 3/ 20    Cancelled Visits: 0   No Show Visits: 0     Time In: 1702  Time Out: 1815  Total Billable Time: 73 minutes    Precautions: Standard    Subjective     Pt reports: After last session she felt very relaxed; that night she had episode of enuresis, which she attributes to loosened muscles. She has not had this since delivery. Though this meant she was ready to begin pelvic floor strengthening. Knows that she should be using HEP, but has not been consistent. Has been biking 3 miles daily to work. Feels more pain in perineum and near tailbone. Worst pain is around vagina. Pain is not as bad as it used to be when she is lifting; attributes this to warming up more. Recently changed jobs and now sits a lot, but tries to get up and walk.   Has been doing ab exercises approx. 2-3 times per week. UUI occurs approx every 2 days now.    She was fairly compliant with home exercise program.  Response to previous treatment: Good   Functional change: Continued pain, UUI     Pain: varies on position  Location: perineum, clitoral region, L ischial tuberosity    Objective     VAGINAL PELVIC FLOOR EXAM    EXTERNAL ASSESSMENT  Introitus: WNL  Skin condition: WNL  Scarring: none   Sensation: WNL   Pain:   B labia majora    Voluntary contraction: visible lift  Voluntary relaxation: visible lift  Involuntary contraction: reflex tightening  Bearing down: bulge  Perineal descent: absent  Comments:  tenderness relieved with sustained ischemic pressure application and pelvic drop but no change in tissue mobility noted       INTERNAL ASSESSMENT  Pain: trigger points as follows: B pubococcygeus, obturator internus (L>R)   Sensation: able to localized pressure appropriately   Vaginal vault: stenotic layer 3, WNL layer 1/2   Muscle Bulk: hypertonus   Muscle Power: 4/5      Quality of contraction: incomplete relaxation   Specificity: WNL   Coordination: cannot isolate PFM from abdominals   Prolapse check:none  Comments: layer 3 relaxed via hip adduction in hooklying       Qu[place]dipti received therapeutic exercises to develop  strength, endurance, ROM, flexibility, posture and core stabilization for 10 minutes including:     Hip adduction with squats   Hip adduction squeeze - contract relax       Quantita received the following manual therapy techniques: to develop extensibility and desensitization for 20 minutes including: trigger point/myofascial release of B transverse perineal, bulbospongiosus, LA     SIP + DB/drops in supine    External @ bulbospongiosus    Internal @ B OI, pubococcygeus     Evelia participated in neuromuscular re-education activities to develop Coordination, Control and Down training for 18 minutes including: pelvic floor relaxation/bulging training, 360 breathing  and diaphragmatic breathing    Drop review - in supine, mod butterfly c/ support    Complete vs layer 3 specific    Inhale > Exhale and gentle contraction > Relaxing breath       Quantita participated in dynamic functional therapeutic activities to improve functional performance for 25  minutes, including: Education as described below       Changes to bicycle seat for decreased pudendal nerve pressure   Increased support at ischial tuberosity, low back for work  Pelvic wand use     Home Exercises Provided and Patient Education Provided     Education provided:   - anatomy/physiology of pelvic floor, posture/body mechanices, bladder  retraining, diaphragmatic breathing and behavior modifications  Discussed progression of plan of care with patient; educated pt in activity modification; reviewed HEP with pt. Pt demonstrated and verbalized understanding of all instruction and was provided with a handout of HEP (see Patient Instructions).    Written Home Exercises Provided: yes.  Exercises were reviewed and Evelia was able to demonstrate them prior to the end of the session.  Evelia demonstrated good  understanding of the education provided.     See EMR under Patient Instructions for exercises provided 6/23/2022.    Assessment     Pelvic floor reassessment performed this session, due to long absence from therapy. Widespread tenderness noted, though significant tension only noted internally at pubococcygeus and obturator internus. Based on presentation, external pain likely secondary to obturator internus-induced irritation of pudendal nerve. Because hip internal rotation resulted in significant reduction of OI relaxation and pain, HEP updated to include. Pelvic dropss reviewed with focus on these muscles and manual release performed , with significant reduction in pain noted at completion. Because of this, pelvic wand use reviewed. Based on presentation, Evelia would benefit from continued physical therapy to restore muscular balance and coordination for decreased pain. During this time, she would also benefit from continued education on improved body mechanics, posture and positioning for reduced irritation of these muscles.     Evelia Is progressing well towards her goals.   Pt prognosis is Good.     Pt will continue to benefit from skilled outpatient physical therapy to address the deficits listed in the problem list box on initial evaluation, provide pt/family education and to maximize pt's level of independence in the home and community environment.     Pt's spiritual, cultural and educational needs considered and pt agreeable to  plan of care and goals.     Anticipated barriers to physical therapy: Chronicity of condition    Goals:   Short Term Goals: 4 weeks - progressing   Pt to demonstrate being able to correctly and consistently perform a kegel which is needed  to increase pelvic floor muscle coordination and strength needed for continence.  Pt to be able to delay the urge to urinate at least 10 minutes with a strong urge to urinate in order to make it to the bathroom without leaking.  Pt to demonstrate proper diaphragmatic breathing to help with calming the nervous system in order to decrease pain and to improve abdominal wall musculature extensibility.   Pt to report being able to complete a half day at work without significant increase in pain to demonstrate a return towards prior level of function.  Pt to demonstrate good body mechanics when performing ADLs such as lifting and bending to decrease strain to lumbopelvic structures and reduce risk of further injury.  Pt will report minimal to no pain with single digit pelvic assessment and display relaxation during this assessment in order to progress to dilator use.        Long Term Goals: 8 weeks  - progressing   Pt to report elimination of incontinence with ADLs to demonstrate improved pelvic floor muscle strength and coordination.  Pt to be able to delay the urge to urinate at least 20 minutes with a strong urge to urinate in order to make it to the bathroom without leaking.  Pt to be discharged with home plan for carry over after discharge.   Pt will be trained and compliant with postural strategies in sitting and standing to improve alignment and decrease pain and muscle fatigue  Pt to report being able to complete a full day at work without significant increase in pain to demonstrate a return towards prior level of function.  Pt to report being able to have a comfortable vaginal exam without significant increase in pelvic pain.    Plan     Plan of care Certification: 6/14/2022 to  9/14/2022.    Plan for next visit: review drops in modified butterfly, hip IR training, gentle hip ER training     MARIE JOSHI, PT   10/01/2022

## 2022-10-01 NOTE — PLAN OF CARE
Outpatient Therapy Updated Plan of Care     Visit Date: 9/29/2022  Name: Evelia Ramon  [facility] Number: [id number]    Therapy Diagnosis:   Encounter Diagnoses   Name Primary?    Pelvic pain Yes    Pelvic floor tension      Physician: Tito Palmer MD    Physician Orders: PT Eval and Treat - Pelvic Floor PT   Medical Diagnosis from Referral: R10.2 (ICD-10-CM) - Pelvic pain in female  Evaluation Date: 6/14/2022    Total Visits Received: 4  Cancelled Visits: 3  No Show Visits: 1    Current Certification Period:  6/14/2022 to 9/14/2022  Precautions:  Standard  Visits from Evaluation Date:  3  Functional Level Prior to Evaluation:  Independent     Subjective     Update: After last session she felt very relaxed; that night she had episode of enuresis, which she attributes to loosened muscles. She has not had this since delivery. Though this meant she was ready to begin pelvic floor strengthening. Knows that she should be using HEP, but has not been consistent. Has been biking 3 miles daily to work. Feels more pain in perineum and near tailbone. Worst pain is around vagina. Pain is not as bad as it used to be when she is lifting; attributes this to warming up more. Recently changed jobs and now sits a lot, but tries to get up and walk.   Has been doing ab exercises approx. 2-3 times per week. UUI occurs approx every 2 days now.    Objective     Update: VAGINAL PELVIC FLOOR EXAM     EXTERNAL ASSESSMENT  Introitus: WNL  Skin condition: WNL  Scarring: none   Sensation: WNL   Pain:   B labia majora    Voluntary contraction: visible lift  Voluntary relaxation: visible lift  Involuntary contraction: reflex tightening  Bearing down: bulge  Perineal descent: absent  Comments: tenderness relieved with sustained ischemic pressure application and pelvic drop but no change in tissue mobility noted                             INTERNAL ASSESSMENT  Pain: trigger points as follows: B pubococcygeus, obturator internus (L>R)    Sensation: able to localized pressure appropriately   Vaginal vault: stenotic layer 3, WNL layer 1/2   Muscle Bulk: hypertonus   Muscle Power: 4/5                    Quality of contraction: incomplete relaxation   Specificity: WNL   Coordination: cannot isolate PFM from abdominals   Prolapse check:none  Comments: layer 3 relaxed via hip adduction in hooklying     Assessment     Update: Pelvic floor reassessment performed this session, due to long absence from therapy. Widespread tenderness noted, though significant tension only noted internally at pubococcygeus and obturator internus. Based on presentation, external pain likely secondary to obturator internus-induced irritation of pudendal nerve. Because hip internal rotation resulted in significant reduction of OI relaxation and pain, HEP updated to include. Pelvic dropss reviewed with focus on these muscles and manual release performed , with significant reduction in pain noted at completion. Because of this, pelvic wand use reviewed. Based on presentation, Evelia would benefit from continued physical therapy to restore muscular balance and coordination for decreased pain. During this time, she would also benefit from continued education on improved body mechanics, posture and positioning for reduced irritation of these muscles.     Previous Short Term Goals Status:   0  New Short Term Goals Status:   0  Long Term Goal Status:   continue per initial plan of care.  Reasons for Recertification of Therapy:   continued need for physical therapy     Plan     Updated Certification Period: 9/29/2022 to 12/29/2022  Recommended Treatment Plan: 1 times per week for 10 weeks: Manual Therapy, Moist Heat/ Ice, Neuromuscular Re-ed, Patient Education, Self Care, Therapeutic Activities, and Therapeutic Exercise    MARIE JOSHI, PT  9/29/2022      I CERTIFY THE NEED FOR THESE SERVICES FURNISHED UNDER THIS PLAN OF TREATMENT AND WHILE UNDER MY CARE    Physician's  comments:        Physician's Signature: ___________________________________________________

## 2022-11-03 ENCOUNTER — CLINICAL SUPPORT (OUTPATIENT)
Dept: REHABILITATION | Facility: HOSPITAL | Age: 32
End: 2022-11-03
Payer: COMMERCIAL

## 2022-11-03 DIAGNOSIS — M62.89 PELVIC FLOOR TENSION: ICD-10-CM

## 2022-11-03 DIAGNOSIS — R10.2 PELVIC PAIN: Primary | ICD-10-CM

## 2022-11-03 PROCEDURE — 97110 THERAPEUTIC EXERCISES: CPT | Mod: PN,97

## 2022-11-03 PROCEDURE — 97140 MANUAL THERAPY 1/> REGIONS: CPT | Mod: PN

## 2022-11-03 PROCEDURE — 97112 NEUROMUSCULAR REEDUCATION: CPT | Mod: PN

## 2022-11-03 PROCEDURE — 97530 THERAPEUTIC ACTIVITIES: CPT | Mod: PN,97

## 2022-11-03 NOTE — PATIENT INSTRUCTIONS
([email or web address])     Relaxation      To isolate layer 1: think about opening around your vagina as you inhale      To isolate layer 2: imagine letting your urethra drop away from you as you inhale      To isolate layer 3: relax at your tailbone to let it expand down and backward as you inhale or think about sit bones widening         So when practicing this at home: Complete 1 minute at each layer 1-2 times per day  Inhale and let pelvic floor muscles expand. Do not push muscles down!  Exhale and let pelvic floor muscles relax. Do not contract!

## 2022-11-03 NOTE — PROGRESS NOTES
Pelvic Health Physical Therapy   Treatment Note     Name: Evelia Ramon  [facility] Number: [id number]    Therapy Diagnosis:   Encounter Diagnoses   Name Primary?    Pelvic pain Yes    Pelvic floor tension        Physician: Tito Palmer MD    Visit Date: 11/3/2022    Physician Orders: PT Eval and Treat - Pelvic Floor PT   Medical Diagnosis from Referral: R10.2 (ICD-10-CM) - Pelvic pain in female  Evaluation Date: 6/14/2022  Authorization Period Expiration: 6/29/2023  Plan of Care Expiration: 12/29/2022  Visit # / Visits authorized: 4/ 20    Cancelled Visits: 0   No Show Visits: 0     Time In: 1702  Time Out: 1815  Total Billable Time: 73 minutes    Precautions: Standard    Subjective     Pt reports: She has noticed that when she readjusts her position while sitting on her bike, she can decrease pain that she experiences while riding. Has also noticed that pain has decreased with exercise as she decreased resistance on hip Abduction and adduction machines. Pain overall is much less, though it still comes and goes. She can now feel muscles relaxing and doreen during pelvic floor training; just started working on contraction of muscles this past week.     She was fairly compliant with home exercise program.  Response to previous treatment: Good   Functional change: Decreased pain, UUI     Pain: varies on position  Location: perineum, clitoral region, L ischial tuberosity    Objective     VAGINAL PELVIC FLOOR EXAM    EXTERNAL ASSESSMENT  Sensation: WNL   Pain:  none noted    Voluntary contraction: visible lift  Voluntary relaxation: visible lift and visible drop  Involuntary contraction: reflex tightening  Bearing down: bulge      INTERNAL ASSESSMENT  Pain: trigger points as follows: pubococcygeus, obturator internus (L>R)   Sensation: able to localized pressure appropriately   Vaginal vault: WNL  Muscle Bulk: slight hypertonicity at layer 3   Muscle Power: 4/5      Quality of contraction: incomplete  relaxation       Evelia received therapeutic exercises to develop  strength, endurance, ROM, flexibility, posture and core stabilization for 33 minutes including:     Review of:   Cat/cow   Todd threaders   Child's pose   Seated pelvic tilts   LTR   SL Open books   90/90 hip rotation     Evelia received the following manual therapy techniques: to develop extensibility and desensitization for 15 minutes including: trigger point/myofascial release of B transverse perineal, bulbospongiosus, LA     SIP + DB/drops in supine    Internal @ B OI, pubococcygeus     Evelia participated in neuromuscular re-education activities to develop Coordination, Control and Down training for 16 minutes including: pelvic floor relaxation/bulging training, 360 breathing  and diaphragmatic breathing    Drop review - in supine, supported hip IR/ER    Complete vs layer 3 specific    Inhale > Exhale and gentle contraction > Relaxing breath  OI release with breathing + external pressure         Evelia participated in dynamic functional therapeutic activities to improve functional performance for 09  minutes, including: Education as described below       Self-release of obturator internus discussed     Home Exercises Provided and Patient Education Provided     Education provided:   - anatomy/physiology of pelvic floor, posture/body mechanices, bladder retraining, diaphragmatic breathing and behavior modifications  Discussed progression of plan of care with patient; educated pt in activity modification; reviewed HEP with pt. Pt demonstrated and verbalized understanding of all instruction and was provided with a handout of HEP (see Patient Instructions).    Written Home Exercises Provided: yes.  Exercises were reviewed and Evelia was able to demonstrate them prior to the end of the session.  Evelia demonstrated good  understanding of the education provided.     See EMR under Patient Instructions for exercises provided  6/23/2022.    Assessment     Pelvic floor reassessment performed this session, with improved control and relaxation of pelvic floor muscles demonstrated. Good recruitment demonstrated, though relaxation from voluntary contraction remains incomplete and inconsistent. Relaxation from reflexive recruitment complete, however. Based on presentation, Evelia instructed to avoid isolated pelvic floor strengthening at the moment and to instead continue with coordination and relaxation training. Based on performance, PT to reassess pelvic floor control NV prior to progressing with coordination training.     Evelia Is progressing well towards her goals.   Pt prognosis is Good.     Pt will continue to benefit from skilled outpatient physical therapy to address the deficits listed in the problem list box on initial evaluation, provide pt/family education and to maximize pt's level of independence in the home and community environment.     Pt's spiritual, cultural and educational needs considered and pt agreeable to plan of care and goals.     Anticipated barriers to physical therapy: Chronicity of condition    Goals:   Short Term Goals: 4 weeks - progressing   Pt to demonstrate being able to correctly and consistently perform a kegel which is needed  to increase pelvic floor muscle coordination and strength needed for continence.  Pt to be able to delay the urge to urinate at least 10 minutes with a strong urge to urinate in order to make it to the bathroom without leaking.  Pt to demonstrate proper diaphragmatic breathing to help with calming the nervous system in order to decrease pain and to improve abdominal wall musculature extensibility.   Pt to report being able to complete a half day at work without significant increase in pain to demonstrate a return towards prior level of function.  Pt to demonstrate good body mechanics when performing ADLs such as lifting and bending to decrease strain to lumbopelvic structures  and reduce risk of further injury.  Pt will report minimal to no pain with single digit pelvic assessment and display relaxation during this assessment in order to progress to dilator use.        Long Term Goals: 8 weeks  - progressing   Pt to report elimination of incontinence with ADLs to demonstrate improved pelvic floor muscle strength and coordination.  Pt to be able to delay the urge to urinate at least 20 minutes with a strong urge to urinate in order to make it to the bathroom without leaking.  Pt to be discharged with home plan for carry over after discharge.   Pt will be trained and compliant with postural strategies in sitting and standing to improve alignment and decrease pain and muscle fatigue  Pt to report being able to complete a full day at work without significant increase in pain to demonstrate a return towards prior level of function.  Pt to report being able to have a comfortable vaginal exam without significant increase in pelvic pain.    Plan     Plan of care Certification: 6/14/2022 to 9/14/2022. - Extended to 12/29/2022    Plan for next visit: reassess PFM, YADIEL JOSHI, PT   11/09/2022

## 2022-12-19 ENCOUNTER — PATIENT MESSAGE (OUTPATIENT)
Dept: FAMILY MEDICINE | Facility: CLINIC | Age: 32
End: 2022-12-19
Payer: COMMERCIAL

## 2023-02-09 ENCOUNTER — CLINICAL SUPPORT (OUTPATIENT)
Dept: REHABILITATION | Facility: OTHER | Age: 33
End: 2023-02-09
Attending: OBSTETRICS & GYNECOLOGY
Payer: COMMERCIAL

## 2023-02-09 DIAGNOSIS — M62.89 PELVIC FLOOR TENSION: ICD-10-CM

## 2023-02-09 DIAGNOSIS — R10.2 PELVIC PAIN: Primary | ICD-10-CM

## 2023-02-09 PROCEDURE — 97530 THERAPEUTIC ACTIVITIES: CPT

## 2023-02-09 PROCEDURE — 97112 NEUROMUSCULAR REEDUCATION: CPT

## 2023-02-09 NOTE — PROGRESS NOTES
Pelvic Health Physical Therapy   Treatment Note     Name: Evelia [facility] Number: [id number]    Therapy Diagnosis:   Encounter Diagnoses   Name Primary?    Pelvic pain Yes    Pelvic floor tension        Physician: Tito Palmer MD    Visit Date: 2/9/2023    Physician Orders: PT Eval and Treat - Pelvic Floor PT   Medical Diagnosis from Referral: R10.2 (ICD-10-CM) - Pelvic pain in female  Evaluation Date: 6/14/2022  Authorization Period Expiration: 6/29/2023  Plan of Care Expiration: 12/29/2022  Visit # / Visits authorized: 1/30    Cancelled Visits: 0   No Show Visits: 0     Time In: 0925  Time Out: 1005  Total Billable Time: 40 minutes    Precautions: Standard    Subjective     Pt reports: Feels like she has had a complete relapse at the pelvic floor.   Has not been biking as much, which has decreased severity some, but she notices it a lot when she is sitting for a while, like when driving. It is better with exercise; has been emphasizing lower weight and higher reps. No sharp pains or posterior pelvic pain; has been feeling across the perineum. Incontinence has resumed; is sporadic. Denies JACQUES but has been experiencing UUI.     She was fairly compliant with home exercise program.  Response to previous treatment: Good   Functional change: Decreased pain, UUI     Pain: varies on position  Location: perineum, clitoral region, L ischial tuberosity    Objective     VAGINAL PELVIC FLOOR EXAM    EXTERNAL ASSESSMENT  Sensation: WNL   Pain:   tenderness 5:00 - 7:00   Voluntary contraction: palpable lift   Voluntary relaxation: palpable drop   Involuntary contraction: reflex tightening  Bearing down: palpable bulge      Evelia received therapeutic exercises to develop  strength, endurance, ROM, flexibility, posture and core stabilization for 00 minutes including:     Evelia received the following manual therapy techniques: to develop extensibility and desensitization for 00 minutes including: trigger  point/myofascial release of B transverse perineal, bulbospongiosus, LA     SIP + DB/drops in supine    Internal @ B OI, pubococcygeus     Evelia participated in neuromuscular re-education activities to develop Coordination, Control and Down training for 15 minutes including: pelvic floor relaxation/bulging training, 360 breathing  and diaphragmatic breathing    Pelvic floor drops/gentle elevators    With cat/cow   In supported sitting    With piriformis stretch, figure 4 stretch     Evelia participated in dynamic functional therapeutic activities to improve functional performance for 25 minutes, including: Education as described below       Pelvic floor considerations during exercise   Using adduction for pelvic floor relaxation - very low weight, slowed performance    Using pelvic drops and coordinated breathing between sets     Urge suppression review    Pelvic floor relaxation    Roll to control     Postural considerations in sitting - at work, in car    Home Exercises Provided and Patient Education Provided     Education provided:   - anatomy/physiology of pelvic floor, posture/body mechanices, bladder retraining, diaphragmatic breathing and behavior modifications  Discussed progression of plan of care with patient; educated pt in activity modification; reviewed HEP with pt. Pt demonstrated and verbalized understanding of all instruction and was provided with a handout of HEP (see Patient Instructions).    Written Home Exercises Provided: yes.  Exercises were reviewed and Evelia was able to demonstrate them prior to the end of the session.  Evelia demonstrated good  understanding of the education provided.     See EMR under Patient Instructions for exercises provided  2/9/2023 .    Assessment     Evelia presents after break from therapy with increased tension at abdominals, pelvic floor muscles, and hips. Based on presentation, pelvic floor mobility training reviewed. Improved relaxation noted  follow gentle co-contractions with hip and abdominal muscles, so HEP updated to include. Based on reports, slumped posture in sitting likely contributing to increased glute, abdominal, and pelvic floor activity and pelvic pain. Because of this, postural considerations and lumbar support reviewed. Improved relaxation noted in sitting with lumbar support pillow. Urge suppression techniques also reviewed, including the importance of pelvic floor relaxation. Evelia was able to use to delay urge at completion of session. Based on presentation, Evelia would continue to benefit from pelvic floor physical therapy.    Evelia Is progressing well towards her goals.   Pt prognosis is Good.     Pt will continue to benefit from skilled outpatient physical therapy to address the deficits listed in the problem list box on initial evaluation, provide pt/family education and to maximize pt's level of independence in the home and community environment.     Pt's spiritual, cultural and educational needs considered and pt agreeable to plan of care and goals.     Anticipated barriers to physical therapy: Chronicity of condition    Goals:   Short Term Goals: 4 weeks - progressing   Pt to demonstrate being able to correctly and consistently perform a kegel which is needed  to increase pelvic floor muscle coordination and strength needed for continence.   Pt to be able to delay the urge to urinate at least 10 minutes with a strong urge to urinate in order to make it to the bathroom without leaking.  Pt to demonstrate proper diaphragmatic breathing to help with calming the nervous system in order to decrease pain and to improve abdominal wall musculature extensibility. - MET 2/9/2023   Pt to report being able to complete a half day at work without significant increase in pain to demonstrate a return towards prior level of function.- MET 2/9/2023   Pt to demonstrate good body mechanics when performing ADLs such as lifting and bending  to decrease strain to lumbopelvic structures and reduce risk of further injury.- MET 2/9/2023   Pt will report minimal to no pain with single digit pelvic assessment and display relaxation during this assessment in order to progress to dilator use.        Long Term Goals: 8 weeks  - progressing   Pt to report elimination of incontinence with ADLs to demonstrate improved pelvic floor muscle strength and coordination.  Pt to be able to delay the urge to urinate at least 20 minutes with a strong urge to urinate in order to make it to the bathroom without leaking.  Pt to be discharged with home plan for carry over after discharge.   Pt will be trained and compliant with postural strategies in sitting and standing to improve alignment and decrease pain and muscle fatigue  Pt to report being able to complete a full day at work without significant increase in pain to demonstrate a return towards prior level of function.  Pt to report being able to have a comfortable vaginal exam without significant increase in pelvic pain.    Plan     Plan of care Certification: 9/29/2022 to 12/29/2022 - Extended to 5/9/2023    Plan for next visit:     MARIE JOSHI, PT   02/09/2023

## 2023-02-09 NOTE — PLAN OF CARE
Outpatient Therapy Updated Plan of Care     Visit Date: 2/9/2023  Name: Evelia Ramon  [facility] Number: [id number]    Therapy Diagnosis:   Encounter Diagnoses   Name Primary?    Pelvic pain Yes    Pelvic floor tension      Physician: Tito Palmer MD    Physician Orders: PT Eval and Treat - Pelvic Floor PT   Medical Diagnosis from Referral: R10.2 (ICD-10-CM) - Pelvic pain in female  Evaluation Date: 6/14/2022    Total Visits Received: 6  Cancelled Visits: 9  No Show Visits: 1    Current Certification Period:  9/29/2022 to 12/29/2023  Precautions:  Standard   Visits from Evaluation Date:  5  Functional Level Prior to Evaluation:  independent     Subjective     Update: Feels like she has had a complete relapse at the pelvic floor.   Has not been biking as much, which has decreased severity some, but she notices it a lot when she is sitting for a while, like when driving. It is better with exercise; has been emphasizing lower weight and higher reps. No sharp pains or posterior pelvic pain; has been feeling across the perineum. Incontinence has resumed; is sporadic. Denies JACQUES but has been experiencing UUI.     Objective     Update:   VAGINAL PELVIC FLOOR EXAM     EXTERNAL ASSESSMENT  Sensation: WNL   Pain:   tenderness 5:00 - 7:00   Voluntary contraction: palpable lift   Voluntary relaxation: palpable drop   Involuntary contraction: reflex tightening  Bearing down: palpable bulge     Assessment     Update: Evelia presents after break from therapy with increased tension at abdominals, pelvic floor muscles, and hips. Based on presentation, pelvic floor mobility training reviewed. Improved relaxation noted follow gentle co-contractions with hip and abdominal muscles, so HEP updated to include. Based on reports, slumped posture in sitting likely contributing to increased glute, abdominal, and pelvic floor activity and pelvic pain. Because of this, postural considerations and lumbar support reviewed. Improved  relaxation noted in sitting with lumbar support pillow. Urge suppression techniques also reviewed, including the importance of pelvic floor relaxation. Evelia was able to use to delay urge at completion of session. Based on presentation, Evelia would continue to benefit from pelvic floor physical therapy.    Previous Short Term Goals Status:   0  New Short Term Goals Status:   3  Long Term Goal Status:   continue per initial plan of care.  Reasons for Recertification of Therapy:   continued pelvic pain caused by limited pelvic floor, abdominal, and hip coordination     Plan     Updated Certification Period: 2/9/2023 to 5/9/2023  Recommended Treatment Plan: 1 times per week for 10 weeks: Manual Therapy, Moist Heat/ Ice, Neuromuscular Re-ed, Patient Education, Self Care, Therapeutic Activities, and Therapeutic Exercise      MARIE JOSHI, PT  2/9/2023      I CERTIFY THE NEED FOR THESE SERVICES FURNISHED UNDER THIS PLAN OF TREATMENT AND WHILE UNDER MY CARE    Physician's comments:        Physician's Signature: ___________________________________________________

## 2023-02-09 NOTE — PATIENT INSTRUCTIONS
"Home Exercise Program: 02/09/2023      INSTRUCTIONS FOR CONTROLLING URINARY URGE      When you experience a strong urge to urinate and know that your bladder is not as full as it has the potential to be (for instance you used the restroom 30 minutes to 1 1/2 hours ago).     FIRST: Stop activity, stand quietly or sit down. Try to stay very still to maintain control. Avoid rushing to the toilet. In this position you can start performing heel raises or toe raises.     SECOND: Begin Quick Flicks (1 second LIFT and squeeze of pelvic floor muscles, 4 second DROP). Pelvic floor contractions send a message to the bladder to relax and hold urine.     THIRD: Relax. Do not rush to the toilet. Take a deep belly or diaphragmatic breath and let it out slowly. Let the urge to urinate pass by using distraction techniques, positive thoughts, visualization, and meditation. Try not to think about going to the bathroom.    FINALLY: If the urge returns, repeat the above steps to regain control. When you feel the urge subside, walk normally to the bathroom. Try to avoid starting to undress until you are in he bathroom and ready to urinate. You can urinate once the urge returns at a later and more appropriate time.     Try this at home first in case you do have an accident, but as you continue to practice, your bladder will increase the ability its capacity and hold more urine without such a strong urge.     "Roll for Control"  Strategies to Delay Voiding    What to do when you feel like you "gotta go gotta go!"    Stop what you are doing.    Take a few good deep breaths (this settles down your nervous system and relaxes your bladder).    WHILE YOU CONTINUE DEEP BREATHING, activate your pelvic floor by performing several quick contractions and releases. (Pelvic floor contractions send a message to the bladder to relax and hold urine.)  Sitting: Roll thigh in and out slowly or squeeze knees together  Standing: Roll thigh in/out slowly and " "gently    As you do the above, you can also count backwards from 100 (to help get your mind off the urge!).     After the urge to void has quietly, you may be able to process with your activity OR if it has been approximately 3 hours since you've voided, you may choose to go to the bathroom and void.      Remember......"Control your bladder before it controls YOU!"        "

## 2023-02-20 ENCOUNTER — OFFICE VISIT (OUTPATIENT)
Dept: FAMILY MEDICINE | Facility: CLINIC | Age: 33
End: 2023-02-20
Payer: COMMERCIAL

## 2023-02-20 VITALS
HEIGHT: 68 IN | BODY MASS INDEX: 26.56 KG/M2 | WEIGHT: 175.25 LBS | DIASTOLIC BLOOD PRESSURE: 86 MMHG | TEMPERATURE: 99 F | OXYGEN SATURATION: 99 % | HEART RATE: 67 BPM | SYSTOLIC BLOOD PRESSURE: 136 MMHG

## 2023-02-20 DIAGNOSIS — R03.0 ELEVATED BLOOD PRESSURE READING IN OFFICE WITH WHITE COAT SYNDROME, WITHOUT DIAGNOSIS OF HYPERTENSION: ICD-10-CM

## 2023-02-20 DIAGNOSIS — R45.89 ANXIETY ABOUT HEALTH: ICD-10-CM

## 2023-02-20 DIAGNOSIS — Z00.00 ANNUAL PHYSICAL EXAM: ICD-10-CM

## 2023-02-20 DIAGNOSIS — E28.2 PCOS (POLYCYSTIC OVARIAN SYNDROME): Primary | ICD-10-CM

## 2023-02-20 PROBLEM — J45.20 MILD INTERMITTENT ASTHMA WITHOUT COMPLICATION: Status: ACTIVE | Noted: 2022-08-08

## 2023-02-20 PROBLEM — Z30.45 ENCOUNTER FOR SURVEILLANCE OF TRANSDERMAL PATCH HORMONAL CONTRACEPTIVE DEVICE: Status: ACTIVE | Noted: 2022-08-08

## 2023-02-20 PROCEDURE — 1160F RVW MEDS BY RX/DR IN RCRD: CPT | Mod: CPTII,S$GLB,, | Performed by: STUDENT IN AN ORGANIZED HEALTH CARE EDUCATION/TRAINING PROGRAM

## 2023-02-20 PROCEDURE — 1160F PR REVIEW ALL MEDS BY PRESCRIBER/CLIN PHARMACIST DOCUMENTED: ICD-10-PCS | Mod: CPTII,S$GLB,, | Performed by: STUDENT IN AN ORGANIZED HEALTH CARE EDUCATION/TRAINING PROGRAM

## 2023-02-20 PROCEDURE — 3008F PR BODY MASS INDEX (BMI) DOCUMENTED: ICD-10-PCS | Mod: CPTII,S$GLB,, | Performed by: STUDENT IN AN ORGANIZED HEALTH CARE EDUCATION/TRAINING PROGRAM

## 2023-02-20 PROCEDURE — 3079F DIAST BP 80-89 MM HG: CPT | Mod: CPTII,S$GLB,, | Performed by: STUDENT IN AN ORGANIZED HEALTH CARE EDUCATION/TRAINING PROGRAM

## 2023-02-20 PROCEDURE — 3075F SYST BP GE 130 - 139MM HG: CPT | Mod: CPTII,S$GLB,, | Performed by: STUDENT IN AN ORGANIZED HEALTH CARE EDUCATION/TRAINING PROGRAM

## 2023-02-20 PROCEDURE — 99205 PR OFFICE/OUTPT VISIT, NEW, LEVL V, 60-74 MIN: ICD-10-PCS | Mod: S$GLB,,, | Performed by: STUDENT IN AN ORGANIZED HEALTH CARE EDUCATION/TRAINING PROGRAM

## 2023-02-20 PROCEDURE — 99205 OFFICE O/P NEW HI 60 MIN: CPT | Mod: S$GLB,,, | Performed by: STUDENT IN AN ORGANIZED HEALTH CARE EDUCATION/TRAINING PROGRAM

## 2023-02-20 PROCEDURE — 3008F BODY MASS INDEX DOCD: CPT | Mod: CPTII,S$GLB,, | Performed by: STUDENT IN AN ORGANIZED HEALTH CARE EDUCATION/TRAINING PROGRAM

## 2023-02-20 PROCEDURE — 3079F PR MOST RECENT DIASTOLIC BLOOD PRESSURE 80-89 MM HG: ICD-10-PCS | Mod: CPTII,S$GLB,, | Performed by: STUDENT IN AN ORGANIZED HEALTH CARE EDUCATION/TRAINING PROGRAM

## 2023-02-20 PROCEDURE — 99999 PR PBB SHADOW E&M-EST. PATIENT-LVL IV: CPT | Mod: PBBFAC,,, | Performed by: STUDENT IN AN ORGANIZED HEALTH CARE EDUCATION/TRAINING PROGRAM

## 2023-02-20 PROCEDURE — 3075F PR MOST RECENT SYSTOLIC BLOOD PRESS GE 130-139MM HG: ICD-10-PCS | Mod: CPTII,S$GLB,, | Performed by: STUDENT IN AN ORGANIZED HEALTH CARE EDUCATION/TRAINING PROGRAM

## 2023-02-20 PROCEDURE — 99999 PR PBB SHADOW E&M-EST. PATIENT-LVL IV: ICD-10-PCS | Mod: PBBFAC,,, | Performed by: STUDENT IN AN ORGANIZED HEALTH CARE EDUCATION/TRAINING PROGRAM

## 2023-02-20 PROCEDURE — 1159F PR MEDICATION LIST DOCUMENTED IN MEDICAL RECORD: ICD-10-PCS | Mod: CPTII,S$GLB,, | Performed by: STUDENT IN AN ORGANIZED HEALTH CARE EDUCATION/TRAINING PROGRAM

## 2023-02-20 PROCEDURE — 1159F MED LIST DOCD IN RCRD: CPT | Mod: CPTII,S$GLB,, | Performed by: STUDENT IN AN ORGANIZED HEALTH CARE EDUCATION/TRAINING PROGRAM

## 2023-02-20 RX ORDER — NORELGESTROMIN AND ETHINYL ESTRADIOL 150; 35 UG/D; UG/D
PATCH TRANSDERMAL
COMMUNITY
Start: 2023-02-06 | End: 2023-06-22

## 2023-02-20 NOTE — PROGRESS NOTES
SUBJECTIVE     Chief Complaint   Patient presents with    Establish Care    Referral     To OBGYN and endocrinology       HPI  Evelia Ramon is a 32 y.o. female with  PCOS, allergy, anemia, asthma, eczema  that presents for establishment of care.    Patient reports that she is interested in finding other options to manage PCOS than OCP. Has been using patch with moderate success but having side effect of reduced sex drive.    Concerns about HTN discussed at length.    Mild intermittent asthma - Has not needed to use inhaler recently, but does have on hand.    PAST MEDICAL HISTORY:  Past Medical History:   Diagnosis Date    Allergy     Anemia     Asthma     Eczema     PCOS (polycystic ovarian syndrome)        ALLERGIES AND MEDICATIONS: updated and reviewed.  Review of patient's allergies indicates:   Allergen Reactions    Celery (apium graveolens) (umbelliferae) Itching and Swelling    Melon Itching and Swelling    Nut - unspecified Anaphylaxis    Latex Rash     Current Outpatient Medications   Medication Sig Dispense Refill    albuterol (PROAIR HFA) 90 mcg/actuation inhaler Inhale 2 puffs into the lungs every 6 (six) hours as needed for Wheezing. Rescue 18 g 11    EPINEPHrine (EPIPEN 2-DELVIS) 0.3 mg/0.3 mL AtIn Inject 0.3 mLs (0.3 mg total) into the muscle once as needed. 2 each 3    XULANE 150-35 mcg/24 hr        No current facility-administered medications for this visit.       ROS  Review of Systems   Constitutional:  Negative for chills, fatigue and fever.   HENT:  Negative for rhinorrhea and sore throat.    Respiratory:  Negative for cough and shortness of breath.    Cardiovascular:  Negative for chest pain and palpitations.   Gastrointestinal:  Negative for constipation, diarrhea, nausea and vomiting.   Genitourinary:  Negative for dysuria.   Musculoskeletal:  Negative for joint swelling.   Skin:  Negative for rash and wound.   Neurological:  Negative for light-headedness and headaches.  "  Psychiatric/Behavioral:  Negative for dysphoric mood and sleep disturbance. The patient is nervous/anxious.        OBJECTIVE     Physical Exam  Vitals:    02/20/23 1010   BP: 136/86   Pulse: 67   Temp: 98.5 °F (36.9 °C)    Body mass index is 27.05 kg/m².  Weight: 79.5 kg (175 lb 4.3 oz)   Height: 5' 7.5" (171.5 cm)     Physical Exam  Vitals reviewed.   Constitutional:       General: She is not in acute distress.  HENT:      Right Ear: External ear normal.      Left Ear: External ear normal.      Nose: Nose normal.      Mouth/Throat:      Mouth: Mucous membranes are moist.   Eyes:      Extraocular Movements: Extraocular movements intact.      Conjunctiva/sclera: Conjunctivae normal.      Pupils: Pupils are equal, round, and reactive to light.   Pulmonary:      Effort: Pulmonary effort is normal.   Abdominal:      General: There is no distension.      Palpations: Abdomen is soft.   Musculoskeletal:         General: No swelling. Normal range of motion.      Cervical back: Normal range of motion.   Skin:     General: Skin is warm and dry.      Findings: No rash.   Neurological:      General: No focal deficit present.      Mental Status: She is alert and oriented to person, place, and time.   Psychiatric:         Mood and Affect: Mood is anxious.         Behavior: Behavior normal.         Health Maintenance         Date Due Completion Date    Hepatitis C Screening Never done ---    TETANUS VACCINE Never done ---    COVID-19 Vaccine (4 - Booster) 03/12/2022 1/15/2022    Influenza Vaccine (1) 09/01/2022 10/30/2021    Cervical Cancer Screening 11/12/2022 11/12/2019              ASSESSMENT     32 y.o. female with     1. PCOS (polycystic ovarian syndrome)    2. Annual physical exam    3. Anxiety about health    4. Elevated blood pressure reading in office with white coat syndrome, without diagnosis of hypertension        PLAN:     1. PCOS (polycystic ovarian syndrome)  - Stable. Pt does not desire any more children. Refer " to ob/gyn and endo. F/u PRN.  - Ambulatory referral/consult to Endocrinology; Future  - Ambulatory referral/consult to Obstetrics / Gynecology; Future    2. Annual physical exam  - Discussed age and gender appropriate screenings at this visit and encouraged a healthy diet low in simple carbohydrates, and increased physical activity.  Counseled on medically appropriate vaccines based on age and current health condition.  Screening test reviewed and discussed with patient.   - Hemoglobin A1C; Future  - Lipid Panel; Future  - TSH; Future  - Comprehensive Metabolic Panel; Future  - CBC Auto Differential; Future    3. Anxiety about health  - Discussed at length. Will act as PCP to provide pt w/ consistent positive relationship with medical professional. Advised to contact if concerns arise. F/u 6 months.    4. Elevated blood pressure reading in office with white coat syndrome, without diagnosis of hypertension   - Discussed at length. Advised pt to get BP cuff for home (arm cuff) and check only when relaxed. Function of elevated BP discussed. F/u if remains elevated.    62 minutes were spent in chart review, documentation and review of results, and evaluation, treatment, and especially counseling of patient on the same day of service.    Norma Zarate MD  02/20/2023 10:34 AM        Follow up in about 6 months (around 8/20/2023).

## 2023-02-22 ENCOUNTER — TELEPHONE (OUTPATIENT)
Dept: FAMILY MEDICINE | Facility: CLINIC | Age: 33
End: 2023-02-22
Payer: COMMERCIAL

## 2023-02-22 NOTE — TELEPHONE ENCOUNTER
Spoke with patient states that she will wait to schedule appoint with Endocrine clinic , provide info to patient , appoint schedule with OB-GYN for future .

## 2023-05-05 ENCOUNTER — CLINICAL SUPPORT (OUTPATIENT)
Dept: REHABILITATION | Facility: OTHER | Age: 33
End: 2023-05-05
Attending: OBSTETRICS & GYNECOLOGY
Payer: COMMERCIAL

## 2023-05-05 DIAGNOSIS — M62.89 PELVIC FLOOR TENSION: ICD-10-CM

## 2023-05-05 DIAGNOSIS — R10.2 PELVIC PAIN: Primary | ICD-10-CM

## 2023-05-05 PROCEDURE — 97530 THERAPEUTIC ACTIVITIES: CPT | Mod: 97

## 2023-05-05 NOTE — PROGRESS NOTES
Pelvic Health Physical Therapy   Treatment Note     Name: Evelia [facility] Number: [id number]    Therapy Diagnosis:   Encounter Diagnoses   Name Primary?    Pelvic pain Yes    Pelvic floor tension        Physician: Tito Palmer MD    Visit Date: 5/5/2023    Physician Orders: PT Eval and Treat - Pelvic Floor PT   Medical Diagnosis from Referral: R10.2 (ICD-10-CM) - Pelvic pain in female  Evaluation Date: 6/14/2022  Authorization Period Expiration: 6/29/2023  Plan of Care Expiration: 7/6/2023   Visit # / Visits authorized: 1/30    Cancelled Visits: 0   No Show Visits: 0     Time In: 1230  Time Out: 1300   Total Billable Time: 30 minutes    Precautions: Standard    Subjective     Pt reports: Has not been using relaxation exercises often, though she has been exercising at gym 3x/week and stretching before then.   Urge suppression has been working well. Because of this, she has not felt very well motivated to continue with relaxation training.  Continues to feel left sided low back pain, especially when laying on left side. Would like to focus treatment on this area. Also feels more back pain when sitting in car. Has been using pelvic tilts to relieve some pain.    She was fairly compliant with home exercise program.  Response to previous treatment: Good   Functional change: Decreased pain, UUI     Pain: varies on position  Location: perineum, clitoral region, L ischial tuberosity    Objective   Observation: Increased prominence of right paraspinals compared to left     Posture:  Decreased lordosis    Lumbar Range of Motion: WFL, painless     Special Tests:  -Repeated Flexion: -  -Repeated Ext: -  - ASLR: -   - SLS: decreased stability on left compared to R    Palpation: Mild TTP at left quadratus lumborum, paraspinals       Evelia received therapeutic exercises to develop  strength, endurance, ROM, flexibility, posture and core stabilization for 00 minutes including:     Evelia received the following  manual therapy techniques: to develop extensibility and desensitization for 00 minutes including: trigger point/myofascial release of B transverse perineal, bulbospongiosus, LA     SIP + DB/drops in supine    Internal @ B OI, pubococcygeus     Evelia participated in neuromuscular re-education activities to develop Coordination, Control and Down training for 00 minutes including: pelvic floor relaxation/bulging training, 360 breathing  and diaphragmatic breathing    Pelvic floor drops/gentle elevators    With cat/cow   In supported sitting    With piriformis stretch, figure 4 stretch     Evelia participated in dynamic functional therapeutic activities to improve functional performance for 25 minutes, including: Education as described below       Modifications to home exercise program, exercise routine   Use of warm up prior to exercise - review of options   Postural considerations during driving, working, lifting/carrying    Home Exercises Provided and Patient Education Provided     Education provided:   - anatomy/physiology of pelvic floor, posture/body mechanices, bladder retraining, diaphragmatic breathing and behavior modifications  Discussed progression of plan of care with patient; educated pt in activity modification; reviewed HEP with pt. Pt demonstrated and verbalized understanding of all instruction and was provided with a handout of HEP (see Patient Instructions).    Written Home Exercises Provided: yes.  Exercises were reviewed and Evelia was able to demonstrate them prior to the end of the session.  Evelia demonstrated good  understanding of the education provided.     See EMR under Patient Instructions for exercises provided  2/9/2023 .    Assessment     Reassessment performed this session, with Evelia demonstrating improved pelvic floor muscle mobility and control, though she continues with left-sided low back pain. Based on reports of pain presentation and trunk assessment, pain likely  secondary trunk and hip muscle imbalance. Modifications to exercise routine and body mechanics reviewed. Based on progress, Evelia would benefit from continued physical therapy for improved trunk and pelvic stabilization training as well as continued review of body mechanics.     Evelia Is progressing well towards her goals.   Pt prognosis is Good.     Pt will continue to benefit from skilled outpatient physical therapy to address the deficits listed in the problem list box on initial evaluation, provide pt/family education and to maximize pt's level of independence in the home and community environment.     Pt's spiritual, cultural and educational needs considered and pt agreeable to plan of care and goals.     Anticipated barriers to physical therapy: Chronicity of condition    Goals:   Short Term Goals: 4 weeks - progressing   Pt to demonstrate being able to correctly and consistently perform a kegel which is needed  to increase pelvic floor muscle coordination and strength needed for continence. - MET 5/6/2023  Pt to be able to delay the urge to urinate at least 10 minutes with a strong urge to urinate in order to make it to the bathroom without leaking.  Pt to demonstrate proper diaphragmatic breathing to help with calming the nervous system in order to decrease pain and to improve abdominal wall musculature extensibility. - MET 2/9/2023   Pt to report being able to complete a half day at work without significant increase in pain to demonstrate a return towards prior level of function.- MET 2/9/2023   Pt to demonstrate good body mechanics when performing ADLs such as lifting and bending to decrease strain to lumbopelvic structures and reduce risk of further injury.- MET 2/9/2023   Pt will report minimal to no pain with single digit pelvic assessment and display relaxation during this assessment in order to progress to dilator use. - MET 5/6/2023        Long Term Goals: 8 weeks  - progressing   Pt to  report elimination of incontinence with ADLs to demonstrate improved pelvic floor muscle strength and coordination.  Pt to be able to delay the urge to urinate at least 20 minutes with a strong urge to urinate in order to make it to the bathroom without leaking.  Pt to be discharged with home plan for carry over after discharge.   Pt will be trained and compliant with postural strategies in sitting and standing to improve alignment and decrease pain and muscle fatigue - MET 5/6/2023  Pt to report being able to complete a full day at work without significant increase in pain to demonstrate a return towards prior level of function.  Pt to report being able to have a comfortable vaginal exam without significant increase in pelvic pain.    Plan     Plan of care Certification:Extended to 7/5/2023    Plan for next visit: progress trunk strength training     MARIE JOSHI, PT   05/06/2023

## 2023-05-19 ENCOUNTER — OFFICE VISIT (OUTPATIENT)
Dept: OBSTETRICS AND GYNECOLOGY | Facility: CLINIC | Age: 33
End: 2023-05-19
Payer: COMMERCIAL

## 2023-05-19 VITALS
BODY MASS INDEX: 27.49 KG/M2 | SYSTOLIC BLOOD PRESSURE: 120 MMHG | DIASTOLIC BLOOD PRESSURE: 80 MMHG | WEIGHT: 178.13 LBS

## 2023-05-19 DIAGNOSIS — F41.9 ANXIETY: ICD-10-CM

## 2023-05-19 DIAGNOSIS — E28.2 PCOS (POLYCYSTIC OVARIAN SYNDROME): Primary | ICD-10-CM

## 2023-05-19 PROCEDURE — 99203 PR OFFICE/OUTPT VISIT, NEW, LEVL III, 30-44 MIN: ICD-10-PCS | Mod: S$GLB,,, | Performed by: OBSTETRICS & GYNECOLOGY

## 2023-05-19 PROCEDURE — 99999 PR PBB SHADOW E&M-EST. PATIENT-LVL III: CPT | Mod: PBBFAC,,, | Performed by: OBSTETRICS & GYNECOLOGY

## 2023-05-19 PROCEDURE — 3008F BODY MASS INDEX DOCD: CPT | Mod: CPTII,S$GLB,, | Performed by: OBSTETRICS & GYNECOLOGY

## 2023-05-19 PROCEDURE — 3079F DIAST BP 80-89 MM HG: CPT | Mod: CPTII,S$GLB,, | Performed by: OBSTETRICS & GYNECOLOGY

## 2023-05-19 PROCEDURE — 3079F PR MOST RECENT DIASTOLIC BLOOD PRESSURE 80-89 MM HG: ICD-10-PCS | Mod: CPTII,S$GLB,, | Performed by: OBSTETRICS & GYNECOLOGY

## 2023-05-19 PROCEDURE — 1159F MED LIST DOCD IN RCRD: CPT | Mod: CPTII,S$GLB,, | Performed by: OBSTETRICS & GYNECOLOGY

## 2023-05-19 PROCEDURE — 3008F PR BODY MASS INDEX (BMI) DOCUMENTED: ICD-10-PCS | Mod: CPTII,S$GLB,, | Performed by: OBSTETRICS & GYNECOLOGY

## 2023-05-19 PROCEDURE — 99999 PR PBB SHADOW E&M-EST. PATIENT-LVL III: ICD-10-PCS | Mod: PBBFAC,,, | Performed by: OBSTETRICS & GYNECOLOGY

## 2023-05-19 PROCEDURE — 99203 OFFICE O/P NEW LOW 30 MIN: CPT | Mod: S$GLB,,, | Performed by: OBSTETRICS & GYNECOLOGY

## 2023-05-19 PROCEDURE — 3074F SYST BP LT 130 MM HG: CPT | Mod: CPTII,S$GLB,, | Performed by: OBSTETRICS & GYNECOLOGY

## 2023-05-19 PROCEDURE — 3074F PR MOST RECENT SYSTOLIC BLOOD PRESSURE < 130 MM HG: ICD-10-PCS | Mod: CPTII,S$GLB,, | Performed by: OBSTETRICS & GYNECOLOGY

## 2023-05-19 PROCEDURE — 1159F PR MEDICATION LIST DOCUMENTED IN MEDICAL RECORD: ICD-10-PCS | Mod: CPTII,S$GLB,, | Performed by: OBSTETRICS & GYNECOLOGY

## 2023-05-19 RX ORDER — ETONOGESTREL AND ETHINYL ESTRADIOL VAGINAL RING .015; .12 MG/D; MG/D
1 RING VAGINAL
Qty: 3 EACH | Refills: 4 | Status: SHIPPED | OUTPATIENT
Start: 2023-05-19 | End: 2023-06-22

## 2023-05-19 NOTE — PROGRESS NOTES
Subjective:      Patient ID: Evelia Ramon is a 32 y.o. female.    Chief Complaint:  No chief complaint on file.      History of Present Illness  HPI  Contraception Counseling  Patient presents for contraception counseling. The patient has no complaints today. The patient is sexually active. Pertinent past medical history: PCOS    She is currently using the patch and [place] like to try something different as the patch is irritating her skin..    GYN & OB History  Patient's last menstrual period was 2023 (exact date).   Date of Last Pap: No result found    OB History    Para Term  AB Living   2 2 2     2   SAB IAB Ectopic Multiple Live Births           2      # Outcome Date GA Lbr Cheng/2nd Weight Sex Delivery Anes PTL Lv   2 Term 21 40w1d / 00:04 3.54 kg (7 lb 12.9 oz) M Vag-Spont EPI N MARYAM   1 Term 18 39w0d 04:35 / 00:39 3.225 kg (7 lb 1.8 oz) F Vag-Vacuum EPI N MARYAM       Review of Systems  Review of Systems   Constitutional: Negative.    HENT: Negative.     Eyes: Negative.    Respiratory: Negative.     Cardiovascular: Negative.    Gastrointestinal: Negative.    Endocrine: Negative.    Genitourinary: Negative.    Musculoskeletal: Negative.    Integumentary:  Negative.   Neurological: Negative.    Hematological: Negative.    Psychiatric/Behavioral: Negative.     Breast: negative.         Objective:     Physical Exam:   Constitutional: She is oriented to person, place, and time. She appears well-developed.    HENT:   Head: Normocephalic and atraumatic.    Eyes: EOM are normal.     Cardiovascular:  Normal rate.             Pulmonary/Chest: Effort normal.        Abdominal: Soft. There is no abdominal tenderness.             Musculoskeletal: Normal range of motion.       Neurological: She is oriented to person, place, and time.    Skin: Skin is warm.    Psychiatric: She has a normal mood and affect.       Assessment:     1. PCOS (polycystic ovarian syndrome)    2. Anxiety                Plan:     1. PCOS (polycystic ovarian syndrome)  - All forms of contraception discussed with the patient, including barrier protection (condoms), estrogen and progesterone methods (pills, patch, ring), progesterone only methods (pills, injection, subdermal implant, IUDs), copper only method (copper IUD), and sterilization (both male and female)..  She voiced understanding.  All questions answered.    - etonogestreL-ethinyl estradioL (NUVARING) 0.12-0.015 mg/24 hr vaginal ring; Place 1 each vaginally every 21 days. Insert one (1) ring vaginally and leave in place for three (3) weeks, then remove for one (1) week.  Dispense: 3 each; Refill: 4    2. Anxiety    - Ambulatory referral/consult to Psychiatry; Future

## 2023-05-26 ENCOUNTER — CLINICAL SUPPORT (OUTPATIENT)
Dept: REHABILITATION | Facility: OTHER | Age: 33
End: 2023-05-26
Payer: COMMERCIAL

## 2023-05-26 DIAGNOSIS — M62.89 PELVIC FLOOR TENSION: ICD-10-CM

## 2023-05-26 DIAGNOSIS — R10.2 PELVIC PAIN: Primary | ICD-10-CM

## 2023-05-26 PROCEDURE — 97530 THERAPEUTIC ACTIVITIES: CPT

## 2023-05-26 PROCEDURE — 97112 NEUROMUSCULAR REEDUCATION: CPT

## 2023-05-26 NOTE — PROGRESS NOTES
Pelvic Health Physical Therapy   Treatment Note     Name: Evelia [facility] Number: [id number]    Therapy Diagnosis:   Encounter Diagnoses   Name Primary?    Pelvic pain Yes    Pelvic floor tension        Physician: Tito Palmer MD    Visit Date: 5/26/2023    Physician Orders: PT Eval and Treat - Pelvic Floor PT   Medical Diagnosis from Referral: R10.2 (ICD-10-CM) - Pelvic pain in female  Evaluation Date: 6/14/2022  Authorization Period Expiration: 6/29/2023  Plan of Care Expiration: 7/6/2023   Visit # / Visits authorized: 3/30    Cancelled Visits: 0   No Show Visits: 0     Time In: 0915  Time Out: 1010  Total Billable Time: 55 minutes    Precautions: Standard    Subjective     Pt reports: Back has been feeling better, which she attributes to changes in gym routine. She has been using dynamic warm up and incorporating mid and upper back strengthening more often.     She was fairly compliant with home exercise program.  Response to previous treatment: Good   Functional change: Decreased pain, UUI     Pain: varies on position  Location: perineum, clitoral region, L ischial tuberosity    Objective      Evelia received therapeutic exercises to develop  strength, endurance, ROM, flexibility, posture and core stabilization for 00 minutes including:     Evelia received the following manual therapy techniques: to develop extensibility and desensitization for 00 minutes including: trigger point/myofascial release of B transverse perineal, bulbospongiosus, LA       Evelia participated in neuromuscular re-education activities to develop Coordination, Control and Down training for 40 minutes including: pelvic floor relaxation/bulging training, 360 breathing  and diaphragmatic breathing    Abdominal + pelvic floor muscle bracing    + pallof press    + lat pull down - single arm, double    + glute kick backs     Diaphragmatic breathing + pelvic floor muscle relaxation    In supported butterfly      Evelia  participated in dynamic functional therapeutic activities to improve functional performance for 20 minutes, including: Education as described below       Modifications to home exercise program, exercise routine   Use of warm up prior to exercise - review of options    Positions for Layer 3 relaxation - staggered standing, lean forward     Home Exercises Provided and Patient Education Provided     Education provided:   - anatomy/physiology of pelvic floor, posture/body mechanices, bladder retraining, diaphragmatic breathing and behavior modifications  Discussed progression of plan of care with patient; educated pt in activity modification; reviewed HEP with pt. Pt demonstrated and verbalized understanding of all instruction and was provided with a handout of HEP (see Patient Instructions).    Written Home Exercises Provided: yes.  Exercises were reviewed and Evelia was able to demonstrate them prior to the end of the session.  Evelia demonstrated good  understanding of the education provided.     See EMR under Patient Instructions for exercises provided  2/9/2023 .    Assessment     Based on improved pain with modifications to exercise routine discussed in previous session, strength and coordination training progressed further this session. Abdominal bracing reviewed, though emphasis on complete relaxation between sets continued. Good pelvic floor muscle awareness demonstrated during check ins between sets. At completion of training, slight anterior pelvic pain noted, though she was able to resolve with anterior-focused relaxation in hip external rotation. Based on performance, Evelia would benefit from continued progression of strengthening and coordination training next visit.     Evelia Is progressing well towards her goals.   Pt prognosis is Good.     Pt will continue to benefit from skilled outpatient physical therapy to address the deficits listed in the problem list box on initial evaluation,  provide pt/family education and to maximize pt's level of independence in the home and community environment.     Pt's spiritual, cultural and educational needs considered and pt agreeable to plan of care and goals.     Anticipated barriers to physical therapy: Chronicity of condition    Goals:   Short Term Goals: 4 weeks - progressing   Pt to demonstrate being able to correctly and consistently perform a kegel which is needed  to increase pelvic floor muscle coordination and strength needed for continence. - MET 5/6/2023  Pt to be able to delay the urge to urinate at least 10 minutes with a strong urge to urinate in order to make it to the bathroom without leaking.  Pt to demonstrate proper diaphragmatic breathing to help with calming the nervous system in order to decrease pain and to improve abdominal wall musculature extensibility. - MET 2/9/2023   Pt to report being able to complete a half day at work without significant increase in pain to demonstrate a return towards prior level of function.- MET 2/9/2023   Pt to demonstrate good body mechanics when performing ADLs such as lifting and bending to decrease strain to lumbopelvic structures and reduce risk of further injury.- MET 2/9/2023   Pt will report minimal to no pain with single digit pelvic assessment and display relaxation during this assessment in order to progress to dilator use. - MET 5/6/2023        Long Term Goals: 8 weeks  - progressing   Pt to report elimination of incontinence with ADLs to demonstrate improved pelvic floor muscle strength and coordination.  Pt to be able to delay the urge to urinate at least 20 minutes with a strong urge to urinate in order to make it to the bathroom without leaking.  Pt to be discharged with home plan for carry over after discharge.   Pt will be trained and compliant with postural strategies in sitting and standing to improve alignment and decrease pain and muscle fatigue - MET 5/6/2023  Pt to report being able  to complete a full day at work without significant increase in pain to demonstrate a return towards prior level of function.  Pt to report being able to have a comfortable vaginal exam without significant increase in pelvic pain.    Plan     Plan of care Certification:Extended to 7/5/2023    Plan for next visit: progress trunk strength training     MARIE JOSHI, PT   05/26/2023

## 2023-06-16 ENCOUNTER — TELEPHONE (OUTPATIENT)
Dept: OBSTETRICS AND GYNECOLOGY | Facility: CLINIC | Age: 33
End: 2023-06-16
Payer: COMMERCIAL

## 2023-06-16 NOTE — TELEPHONE ENCOUNTER
Pt is requesting to get back on ocp. Pt is not using NuvaRing. Has been using patches, but as not used patch in about 3 days because they keep falling off. Pt wants to get the same ocp that she was on previously. Pt will obtain the previous rx and advise.     ----- Message from Brooke Hargrove sent at 6/16/2023  8:20 AM CDT -----  .Type: Patient Call Back    Who called: Self     What is the request in detail: Patient stated she doesn't want the etonogestreL-ethinyl estradioL (NUVARING) 0.12-0.015 mg/24 hr vaginal ring, that she would like the pills (low-estrin).     Can the clinic reply by [facility]? No     Would the patient rather a call back or a response via [facility]? Call back     Best call back number: .[phone] (home)       Additional Information:

## 2023-06-22 ENCOUNTER — TELEPHONE (OUTPATIENT)
Dept: OBSTETRICS AND GYNECOLOGY | Facility: CLINIC | Age: 33
End: 2023-06-22
Payer: COMMERCIAL

## 2023-06-22 DIAGNOSIS — E28.2 PCOS (POLYCYSTIC OVARIAN SYNDROME): Primary | ICD-10-CM

## 2023-06-22 RX ORDER — DROSPIRENONE AND ETHINYL ESTRADIOL 0.03MG-3MG
1 KIT ORAL DAILY
Qty: 28 TABLET | Refills: 11 | Status: SHIPPED | OUTPATIENT
Start: 2023-06-22 | End: 2023-06-29 | Stop reason: SDUPTHER

## 2023-06-26 ENCOUNTER — TELEPHONE (OUTPATIENT)
Dept: FAMILY MEDICINE | Facility: CLINIC | Age: 33
End: 2023-06-26

## 2023-06-26 ENCOUNTER — PATIENT MESSAGE (OUTPATIENT)
Dept: FAMILY MEDICINE | Facility: CLINIC | Age: 33
End: 2023-06-26
Payer: COMMERCIAL

## 2023-06-26 DIAGNOSIS — L20.84 INTRINSIC ECZEMA: Primary | ICD-10-CM

## 2023-06-26 DIAGNOSIS — E28.2 PCOS (POLYCYSTIC OVARIAN SYNDROME): ICD-10-CM

## 2023-06-26 NOTE — TELEPHONE ENCOUNTER
Patient advised of Dr. Zarate's response.  Patient will come in and complete another Release of Information form.

## 2023-06-26 NOTE — TELEPHONE ENCOUNTER
Informed patient that Medical Records have not been uploaded to chart, but will find out from Dr. Zarate if Medical Records have been received.  Please advise.

## 2023-06-26 NOTE — TELEPHONE ENCOUNTER
----- Message from Jan Griffin sent at 6/26/2023  1:26 PM CDT -----  Type: Patient Call Back    Who called: Self    What is the request in detail: Patient would like to know if her medical records were received     Can the clinic reply by [facility]? no    Would the patient rather a call back or a response via [facility]? Call    Best call back number: [phone]    Additional Information:

## 2023-06-27 RX ORDER — TRIAMCINOLONE ACETONIDE 1 MG/G
CREAM TOPICAL 2 TIMES DAILY
Qty: 80 G | Refills: 0 | Status: SHIPPED | OUTPATIENT
Start: 2023-06-27 | End: 2023-07-04

## 2023-06-28 ENCOUNTER — PATIENT MESSAGE (OUTPATIENT)
Dept: FAMILY MEDICINE | Facility: CLINIC | Age: 33
End: 2023-06-28
Payer: COMMERCIAL

## 2023-06-29 DIAGNOSIS — E28.2 PCOS (POLYCYSTIC OVARIAN SYNDROME): ICD-10-CM

## 2023-06-29 RX ORDER — DROSPIRENONE AND ETHINYL ESTRADIOL 0.03MG-3MG
1 KIT ORAL DAILY
Qty: 28 TABLET | Refills: 11 | OUTPATIENT
Start: 2023-06-29 | End: 2024-06-28

## 2023-06-29 RX ORDER — DROSPIRENONE AND ETHINYL ESTRADIOL 0.03MG-3MG
1 KIT ORAL DAILY
Qty: 84 TABLET | Refills: 4 | Status: SHIPPED | OUTPATIENT
Start: 2023-06-29 | End: 2023-10-13

## 2023-07-05 ENCOUNTER — TELEPHONE (OUTPATIENT)
Dept: DERMATOLOGY | Facility: CLINIC | Age: 33
End: 2023-07-05
Payer: COMMERCIAL

## 2023-07-05 NOTE — TELEPHONE ENCOUNTER
Unfortunately we don't have sooner appts available.      Ashly      ----- Message from Debbie Chavez RN sent at 7/5/2023 11:13 AM CDT -----  Regarding: FW: Needing a sooner appt  Contact: @ [phone]    ----- Message -----  From: Jayson Delgado  Sent: 7/3/2023   2:43 PM CDT  To: [facility] Staff  Subject: Needing a sooner appt                            Pt is calling to speak to someone in the office; asking for a sooner appt than what they are scheduled for on 11/2/2023 . Pt is already on the wait list; no available appts in Epic that are coming up soon. Pt is asking to speak to someone in the office. Please call to advise. Thanks.         Reason for sooner appt: having an eczema flare up and its getting worse & spreading          Pt's DX: L20.84 (ICD-10-CM) - Intrinsic eczema         When is the first available appointment? 11/2/2023         Communication Preference: call          Additional Information: Pt is wanting an appt this week 7/5/2023-7/7/2023. Pt states that she might just need a topical cream and is open to a virtual visit.

## 2023-07-11 ENCOUNTER — TELEPHONE (OUTPATIENT)
Dept: FAMILY MEDICINE | Facility: CLINIC | Age: 33
End: 2023-07-11
Payer: COMMERCIAL

## 2023-07-11 NOTE — TELEPHONE ENCOUNTER
----- Message from Eran Jimenez sent at 7/11/2023  8:07 AM CDT -----  Regarding: Self  [phone]  Type:  Patient Requesting Referral    Who Called: Self    Referral to What Specialty: Allergy     Reason for Referral: Allergies     Does the patient want the referral with a specific physician?: Any     Is the specialist an [facility] or [facility]?: [facility]     Would the patient rather a call back or a response via [facility]?  Call back.     Best Call Back Number: [phone]     Additional Information:     
Called pt regarding below; need to have reason for referral; LM to call office  
No

## 2023-08-11 ENCOUNTER — CLINICAL SUPPORT (OUTPATIENT)
Dept: REHABILITATION | Facility: OTHER | Age: 33
End: 2023-08-11
Payer: COMMERCIAL

## 2023-08-11 DIAGNOSIS — M62.89 PELVIC FLOOR TENSION: ICD-10-CM

## 2023-08-11 DIAGNOSIS — R10.2 PELVIC PAIN: Primary | ICD-10-CM

## 2023-08-11 PROCEDURE — 97112 NEUROMUSCULAR REEDUCATION: CPT

## 2023-08-11 PROCEDURE — 97530 THERAPEUTIC ACTIVITIES: CPT

## 2023-08-11 NOTE — PROGRESS NOTES
Pelvic Health Physical Therapy   Treatment Note     Name: Evelia [facility] Number: [id number]    Therapy Diagnosis:   Encounter Diagnoses   Name Primary?    Pelvic pain Yes    Pelvic floor tension          Physician: Tito Palmer MD    Visit Date: 8/11/2023    Physician Orders: PT Eval and Treat - Pelvic Floor PT   Medical Diagnosis from Referral: R10.2 (ICD-10-CM) - Pelvic pain in female  Evaluation Date: 6/14/2022  Authorization Period Expiration: 6/29/2023  Plan of Care Expiration: 7/6/2023   Visit # / Visits authorized: 3/30    Cancelled Visits: 0   No Show Visits: 0     Time In: 0915  Time Out: 1010  Total Billable Time: 55 minutes    Precautions: Standard    Subjective     Pt reports: Back has been feeling better, though she has noticed increased pain with work shoes.     She was fairly compliant with home exercise program.  Response to previous treatment: Good   Functional change: Decreased pain, UUI     Pain: varies on position  Location: perineum, clitoral region, L ischial tuberosity    Objective      Evelia received therapeutic exercises to develop  strength, endurance, ROM, flexibility, posture and core stabilization for 00 minutes including:     Evelia received the following manual therapy techniques: to develop extensibility and desensitization for 00 minutes including: trigger point/myofascial release of B transverse perineal, bulbospongiosus, LA       Evelia participated in neuromuscular re-education activities to develop Coordination, Control and Down training for 40 minutes including: pelvic floor relaxation/bulging training, 360 breathing  and diaphragmatic breathing    Improved foot/ankle alignment    Ankle inversion    Intrinsic activation   Lateral hip recruitment    Side steps, monster walk    Side lying squats to chair 20#    Step up 20#     Evelia participated in dynamic functional therapeutic activities to improve functional performance for 15 minutes, including:  Education as described below       Shoe considerations   LE alignment with exercise    Home Exercises Provided and Patient Education Provided     Education provided:   - anatomy/physiology of pelvic floor, posture/body mechanices, bladder retraining, diaphragmatic breathing and behavior modifications  Discussed progression of plan of care with patient; educated pt in activity modification; reviewed HEP with pt. Pt demonstrated and verbalized understanding of all instruction and was provided with a handout of HEP (see Patient Instructions).    Written Home Exercises Provided: yes.  Exercises were reviewed and Evelia was able to demonstrate them prior to the end of the session.  Evelia demonstrated good  understanding of the education provided.     See EMR under Patient Instructions for exercises provided  2/9/2023 .    Assessment     Evelia presented with significant improvement in pain and muscular balance; however she continues with slight postural asymmetries resulting in episodic back pain. Based on presentation, glute weakness, coupled with pes planus and genu valgus, have likely contributed to back pain. Improved alignment noted following review of new home exercise program. Plan to reassess next visit and consider upcoming discharge.    Evelia Is progressing well towards her goals.   Pt prognosis is Good.     Pt will continue to benefit from skilled outpatient physical therapy to address the deficits listed in the problem list box on initial evaluation, provide pt/family education and to maximize pt's level of independence in the home and community environment.     Pt's spiritual, cultural and educational needs considered and pt agreeable to plan of care and goals.     Anticipated barriers to physical therapy: Chronicity of condition    Goals:   Short Term Goals: 4 weeks - progressing   Pt to demonstrate being able to correctly and consistently perform a kegel which is needed  to increase pelvic  floor muscle coordination and strength needed for continence. - MET 5/6/2023  Pt to be able to delay the urge to urinate at least 10 minutes with a strong urge to urinate in order to make it to the bathroom without leaking.  Pt to demonstrate proper diaphragmatic breathing to help with calming the nervous system in order to decrease pain and to improve abdominal wall musculature extensibility. - MET 2/9/2023   Pt to report being able to complete a half day at work without significant increase in pain to demonstrate a return towards prior level of function.- MET 2/9/2023   Pt to demonstrate good body mechanics when performing ADLs such as lifting and bending to decrease strain to lumbopelvic structures and reduce risk of further injury.- MET 2/9/2023   Pt will report minimal to no pain with single digit pelvic assessment and display relaxation during this assessment in order to progress to dilator use. - MET 5/6/2023        Long Term Goals: 8 weeks  - progressing   Pt to report elimination of incontinence with ADLs to demonstrate improved pelvic floor muscle strength and coordination.  Pt to be able to delay the urge to urinate at least 20 minutes with a strong urge to urinate in order to make it to the bathroom without leaking.  Pt to be discharged with home plan for carry over after discharge.   Pt will be trained and compliant with postural strategies in sitting and standing to improve alignment and decrease pain and muscle fatigue - MET 5/6/2023  Pt to report being able to complete a full day at work without significant increase in pain to demonstrate a return towards prior level of function.  Pt to report being able to have a comfortable vaginal exam without significant increase in pelvic pain.    Plan     Plan of care Certification:Extended to 7/5/2023    Plan for next visit: progress trunk strength training, consider upcoming discharge     MARIE JOSHI, PT   08/11/2023

## 2023-08-26 ENCOUNTER — PATIENT MESSAGE (OUTPATIENT)
Dept: FAMILY MEDICINE | Facility: CLINIC | Age: 33
End: 2023-08-26
Payer: COMMERCIAL

## 2023-08-26 DIAGNOSIS — Z91.018 FOOD ALLERGY: ICD-10-CM

## 2023-08-28 RX ORDER — EPINEPHRINE 0.3 MG/.3ML
1 INJECTION SUBCUTANEOUS ONCE AS NEEDED
Qty: 2 EACH | Refills: 3 | Status: SHIPPED | OUTPATIENT
Start: 2023-08-28 | End: 2023-08-28

## 2023-08-28 NOTE — TELEPHONE ENCOUNTER
Patient would like to know if Dr. Zarate would be okay with ordering her Albuterol Inhaler.  She does not need a refill at this time, but just wanted to know. Please advise.

## 2023-10-13 ENCOUNTER — OFFICE VISIT (OUTPATIENT)
Dept: OBSTETRICS AND GYNECOLOGY | Facility: CLINIC | Age: 33
End: 2023-10-13
Payer: COMMERCIAL

## 2023-10-13 VITALS
BODY MASS INDEX: 26.74 KG/M2 | SYSTOLIC BLOOD PRESSURE: 120 MMHG | WEIGHT: 173.31 LBS | DIASTOLIC BLOOD PRESSURE: 70 MMHG

## 2023-10-13 DIAGNOSIS — Z30.09 ENCOUNTER FOR GENERAL COUNSELING AND ADVICE ON CONTRACEPTIVE MANAGEMENT: ICD-10-CM

## 2023-10-13 DIAGNOSIS — E28.2 PCOS (POLYCYSTIC OVARIAN SYNDROME): ICD-10-CM

## 2023-10-13 DIAGNOSIS — Z12.39 SCREENING BREAST EXAMINATION: ICD-10-CM

## 2023-10-13 DIAGNOSIS — Z01.419 ENCOUNTER FOR GYNECOLOGICAL EXAMINATION WITHOUT ABNORMAL FINDING: Primary | ICD-10-CM

## 2023-10-13 PROCEDURE — 87624 HPV HI-RISK TYP POOLED RSLT: CPT | Performed by: OBSTETRICS & GYNECOLOGY

## 2023-10-13 PROCEDURE — 99999 PR PBB SHADOW E&M-EST. PATIENT-LVL III: CPT | Mod: PBBFAC,,, | Performed by: OBSTETRICS & GYNECOLOGY

## 2023-10-13 PROCEDURE — 3008F PR BODY MASS INDEX (BMI) DOCUMENTED: ICD-10-PCS | Mod: CPTII,S$GLB,, | Performed by: OBSTETRICS & GYNECOLOGY

## 2023-10-13 PROCEDURE — 1159F PR MEDICATION LIST DOCUMENTED IN MEDICAL RECORD: ICD-10-PCS | Mod: CPTII,S$GLB,, | Performed by: OBSTETRICS & GYNECOLOGY

## 2023-10-13 PROCEDURE — 3078F PR MOST RECENT DIASTOLIC BLOOD PRESSURE < 80 MM HG: ICD-10-PCS | Mod: CPTII,S$GLB,, | Performed by: OBSTETRICS & GYNECOLOGY

## 2023-10-13 PROCEDURE — 99395 PR PREVENTIVE VISIT,EST,18-39: ICD-10-PCS | Mod: S$GLB,,, | Performed by: OBSTETRICS & GYNECOLOGY

## 2023-10-13 PROCEDURE — 3078F DIAST BP <80 MM HG: CPT | Mod: CPTII,S$GLB,, | Performed by: OBSTETRICS & GYNECOLOGY

## 2023-10-13 PROCEDURE — 99999 PR PBB SHADOW E&M-EST. PATIENT-LVL III: ICD-10-PCS | Mod: PBBFAC,,, | Performed by: OBSTETRICS & GYNECOLOGY

## 2023-10-13 PROCEDURE — 1159F MED LIST DOCD IN RCRD: CPT | Mod: CPTII,S$GLB,, | Performed by: OBSTETRICS & GYNECOLOGY

## 2023-10-13 PROCEDURE — 3074F PR MOST RECENT SYSTOLIC BLOOD PRESSURE < 130 MM HG: ICD-10-PCS | Mod: CPTII,S$GLB,, | Performed by: OBSTETRICS & GYNECOLOGY

## 2023-10-13 PROCEDURE — 3008F BODY MASS INDEX DOCD: CPT | Mod: CPTII,S$GLB,, | Performed by: OBSTETRICS & GYNECOLOGY

## 2023-10-13 PROCEDURE — 3074F SYST BP LT 130 MM HG: CPT | Mod: CPTII,S$GLB,, | Performed by: OBSTETRICS & GYNECOLOGY

## 2023-10-13 PROCEDURE — 88175 CYTOPATH C/V AUTO FLUID REDO: CPT | Performed by: OBSTETRICS & GYNECOLOGY

## 2023-10-13 PROCEDURE — 99395 PREV VISIT EST AGE 18-39: CPT | Mod: S$GLB,,, | Performed by: OBSTETRICS & GYNECOLOGY

## 2023-10-13 RX ORDER — NORELGESTROMIN AND ETHINYL ESTRADIOL 150; 35 UG/D; UG/D
1 PATCH TRANSDERMAL WEEKLY
Qty: 4 PATCH | Refills: 11 | Status: SHIPPED | OUTPATIENT
Start: 2023-10-13 | End: 2024-02-29

## 2023-10-13 NOTE — PROGRESS NOTES
Subjective:      Patient ID: Evelia Ramon is a 32 y.o. female.    Chief Complaint:  Well Woman      History of Present Illness  HPI  Annual Exam-Premenopausal  Patient presents for annual exam. The patient has no complaints today. The patient is sexually active. GYN screening history: last pap: was normal. The patient wears seatbelts: yes. The patient participates in regular exercise: yes. Has the patient ever been transfused or tattooed?: not asked. The patient reports that there is not domestic violence in her life.    Would like dean iud    GYN & OB History  Patient's last menstrual period was 2023 (approximate).   Date of Last Pap: No result found    OB History    Para Term  AB Living   2 2 2     2   SAB IAB Ectopic Multiple Live Births           2      # Outcome Date GA Lbr Cheng/2nd Weight Sex Delivery Anes PTL Lv   2 Term 21 40w1d / 00:04 3.54 kg (7 lb 12.9 oz) M Vag-Spont EPI N MARYAM   1 Term 18 39w0d 04:35 / 00:39 3.225 kg (7 lb 1.8 oz) F Vag-Vacuum EPI N MARYAM     Past Medical History:  Past Medical History:   Diagnosis Date    Allergy     Anemia     Asthma     Eczema     PCOS (polycystic ovarian syndrome)        Past Surgical History:  Past Surgical History:   Procedure Laterality Date    CERVICAL CERCLAGE      WISDOM TOOTH EXTRACTION         Family History:  Family History   Problem Relation Age of Onset    Hypertension Mother     Osteoporosis Maternal Aunt     Bone cancer Paternal Uncle     Hypertension Maternal Grandmother     Arthritis Maternal Grandmother     Dementia Maternal Grandmother     Hypertension Paternal Grandmother     Diabetes Paternal Grandmother        Allergies:  Review of patient's allergies indicates:   Allergen Reactions    Celery (apium graveolens) (umbelliferae) Itching and Swelling    Melon Itching and Swelling    Nut - unspecified Anaphylaxis    Latex Rash       Medications:  Current Outpatient Medications on File Prior to Visit   Medication  Sig Dispense Refill    albuterol (PROAIR HFA) 90 mcg/actuation inhaler Inhale 2 puffs into the lungs every 6 (six) hours as needed for Wheezing. Rescue (Patient not taking: Reported on 10/13/2023) 18 g 11    drospirenone-ethinyl estradioL (MONICA, 28,) 3-0.03 mg per tablet Take 1 tablet by mouth once daily. (Patient not taking: Reported on 10/13/2023) 84 tablet 4    EPINEPHrine (EPIPEN 2-DELVIS) 0.3 mg/0.3 mL AtIn Inject 0.3 mLs (0.3 mg total) into the muscle once as needed (Allergic reaction). 2 each 3    triamcinolone acetonide 0.1% (KENALOG) 0.1 % cream Apply topically 2 (two) times daily. for 7 days 80 g 0     No current facility-administered medications on file prior to visit.       Social History:  Social History     Tobacco Use    Smoking status: Never     Passive exposure: Never    Smokeless tobacco: Never   Substance Use Topics    Alcohol use: No    Drug use: No            Review of Systems  Review of Systems   Constitutional: Negative.    HENT: Negative.     Eyes: Negative.    Respiratory: Negative.     Cardiovascular: Negative.    Gastrointestinal: Negative.    Endocrine: Negative.    Genitourinary: Negative.    Musculoskeletal: Negative.    Integumentary:  Negative.   Neurological: Negative.    Hematological: Negative.    Psychiatric/Behavioral: Negative.     Breast: negative.           Objective:     Physical Exam:   Constitutional: She is oriented to person, place, and time. She appears well-nourished.    HENT:   Head: Normocephalic and atraumatic.    Eyes: EOM are normal. Right eye exhibits normal extraocular motion. Left eye exhibits normal extraocular motion.    Neck: No thyromegaly present.    Cardiovascular:  Normal rate.             Pulmonary/Chest: Effort normal. No respiratory distress. Right breast exhibits no mass, no skin change and no tenderness. Left breast exhibits no mass, no skin change and no tenderness. Breasts are symmetrical.        Abdominal: Soft. She exhibits no distension and no  mass. There is no abdominal tenderness.     Genitourinary:    Vagina, uterus, right adnexa and left adnexa normal.      Pelvic exam was performed with patient supine.   The external female genitalia was normal.   No external genitalia lesions identified,Genitalia hair distrobution normal .   Labial bartholins normal.There is no rash or lesion on the right labia. There is no rash or lesion on the left labia. Cervix is normal. Right adnexum displays no tenderness and no fullness. Left adnexum displays no tenderness and no fullness. No  no vaginal discharge or bleeding in the vagina.    No signs of injury in the vagina.   Vagina was moist.Cervix exhibits no motion tenderness and no friability. Uterus consistancy normal. and Uerus contour normal  Uterus is not tender. Normal urethral meatus.Urethral Meatus exhibits: urethral lesion and prolapsedUrethra findings: no urethral mass and no tendernessBladder findings: no bladder distention and no bladder tenderness          Musculoskeletal: Normal range of motion.      Lymphadenopathy:     She has no cervical adenopathy.    Neurological: She is oriented to person, place, and time.   Cranial Nerves II-XII grossly intact.    Skin: No rash noted. No erythema.    Psychiatric: She has a normal mood and affect. Her behavior is normal.         Assessment:     1. Encounter for gynecological examination without abnormal finding    2. Screening breast examination    3. PCOS (polycystic ovarian syndrome)    4. Encounter for general counseling and advice on contraceptive management               Plan:   1. Encounter for gynecological examination without abnormal finding  - Pap and HPV done today.  -   Screening tests as ordered.  - Diet and exercise encouraged.    Counseling: injury prevention: Driving under the influence of alcohol  Seatbelts  Stress management techniques  indications for and frequency of periodic gynecologic exam  reviewed current Pap guidelines. Explained new  understanding of natural history of cervical disease and improved Paps. Recommended guideline concordant care.  - Liquid-Based Pap Smear, Screening  - HPV High Risk Genotypes, PCR    2. Screening breast examination  - Self breast exams encouraged    3. PCOS (polycystic ovarian syndrome)  - norelgestromin-ethinyl estradiol (XULANE) 150-35 mcg/24 hr; Place 1 patch onto the skin once a week.  Dispense: 4 patch; Refill: 11    4. Encounter for general counseling and advice on contraceptive management  - All forms of contraception discussed with the patient, including barrier protection (condoms), estrogen and progesterone methods (pills, patch, ring), progesterone only methods (pills, injection, subdermal implant, IUDs), copper only method (copper IUD), and sterilization (both male and female)..  She voiced understanding.  All questions answered.  - Patient decided she will use Ingris.  - Device Authorization Order

## 2023-10-18 LAB
FINAL PATHOLOGIC DIAGNOSIS: NORMAL
HPV HR 12 DNA SPEC QL NAA+PROBE: NEGATIVE
HPV16 AG SPEC QL: NEGATIVE
HPV18 DNA SPEC QL NAA+PROBE: NEGATIVE
Lab: NORMAL

## 2023-10-19 ENCOUNTER — PATIENT MESSAGE (OUTPATIENT)
Dept: OBSTETRICS AND GYNECOLOGY | Facility: CLINIC | Age: 33
End: 2023-10-19
Payer: COMMERCIAL

## 2023-12-15 ENCOUNTER — PATIENT MESSAGE (OUTPATIENT)
Dept: OBSTETRICS AND GYNECOLOGY | Facility: CLINIC | Age: 33
End: 2023-12-15
Payer: COMMERCIAL

## 2023-12-20 ENCOUNTER — PROCEDURE VISIT (OUTPATIENT)
Dept: OBSTETRICS AND GYNECOLOGY | Facility: CLINIC | Age: 33
End: 2023-12-20
Payer: COMMERCIAL

## 2023-12-20 VITALS
BODY MASS INDEX: 26.88 KG/M2 | SYSTOLIC BLOOD PRESSURE: 120 MMHG | WEIGHT: 174.19 LBS | DIASTOLIC BLOOD PRESSURE: 70 MMHG

## 2023-12-20 DIAGNOSIS — Z30.430 ENCOUNTER FOR IUD INSERTION: Primary | ICD-10-CM

## 2023-12-20 LAB
B-HCG UR QL: NEGATIVE
CTP QC/QA: YES

## 2023-12-20 PROCEDURE — 81025 URINE PREGNANCY TEST: CPT | Mod: S$GLB,,, | Performed by: OBSTETRICS & GYNECOLOGY

## 2023-12-20 PROCEDURE — 81025 POCT URINE PREGNANCY: ICD-10-PCS | Mod: S$GLB,,, | Performed by: OBSTETRICS & GYNECOLOGY

## 2023-12-20 PROCEDURE — 58300 INSERT INTRAUTERINE DEVICE: CPT | Mod: S$GLB,,, | Performed by: OBSTETRICS & GYNECOLOGY

## 2023-12-20 PROCEDURE — 58300 INSERTION OF IUD: ICD-10-PCS | Mod: S$GLB,,, | Performed by: OBSTETRICS & GYNECOLOGY

## 2023-12-20 NOTE — PROCEDURES
Insertion of IUD    Date/Time: 12/20/2023 1:45 PM    Performed by: Katarzyna Thomas MD  Authorized by: Katarzyna Thomas MD    Consent:     Consent obtained:  Prior to procedure the appropriate consent was completed and verified    Consent given by:  Patient    Procedure risks and benefits discussed: yes      Patient questions answered: yes      Patient agrees, verbalizes understanding, and wants to proceed: yes     Device to be inserted was verified by patient: yes    Educational handouts given: yes      Instructions and paperwork completed: yes    Insertion Procedure:   14 mcg levonorgestreL 14 mcg/24 hrs (3 yrs) 13.5 mg       Pelvic exam performed: yes      Negative GC/chlamydia test: no      Negative urine pregnancy test: yes      Negative serum pregnancy test: no      Cervix cleaned and prepped: yes      Speculum placed in vagina: yes      Tenaculum applied to cervix: yes      Uterus sounded: yes      Uterus sound depth (cm):  7    IUD inserted with no complications: yes      IUD type:  Ingris    Strings trimmed: yes    Post-procedure:     Patient tolerated procedure well: yes      Patient will follow up after next period: yes

## 2023-12-31 ENCOUNTER — PATIENT MESSAGE (OUTPATIENT)
Dept: OBSTETRICS AND GYNECOLOGY | Facility: CLINIC | Age: 33
End: 2023-12-31
Payer: COMMERCIAL

## 2023-12-31 ENCOUNTER — ON-DEMAND VIRTUAL (OUTPATIENT)
Dept: URGENT CARE | Facility: CLINIC | Age: 33
End: 2023-12-31
Payer: COMMERCIAL

## 2023-12-31 DIAGNOSIS — N94.9 VAGINAL DISCOMFORT: Primary | ICD-10-CM

## 2023-12-31 DIAGNOSIS — N93.9 VAGINAL BLEEDING: ICD-10-CM

## 2023-12-31 PROCEDURE — 99212 PR OFFICE/OUTPT VISIT, EST, LEVL II, 10-19 MIN: ICD-10-PCS | Mod: 95,,,

## 2023-12-31 PROCEDURE — 99212 OFFICE O/P EST SF 10 MIN: CPT | Mod: 95,,,

## 2023-12-31 NOTE — PROGRESS NOTES
Subjective:      Patient ID: Evelia Ramon is a 33 y.o. female.    Vitals:  vitals were not taken for this visit.     Chief Complaint: Vaginal Bleeding      Visit Type: TELE AUDIOVISUAL    Present with the patient at the time of consultation: TELEMED PRESENT WITH PATIENT: family member toddler child present     Past Medical History:   Diagnosis Date    Allergy     Anemia     Asthma     Eczema     PCOS (polycystic ovarian syndrome)      Past Surgical History:   Procedure Laterality Date    CERVICAL CERCLAGE      WISDOM TOOTH EXTRACTION       Review of patient's allergies indicates:   Allergen Reactions    Celery (apium graveolens) (umbelliferae) Itching and Swelling    Melon Itching and Swelling    Nut - unspecified Anaphylaxis    Latex Rash     Current Outpatient Medications on File Prior to Visit   Medication Sig Dispense Refill    albuterol (PROAIR HFA) 90 mcg/actuation inhaler Inhale 2 puffs into the lungs every 6 (six) hours as needed for Wheezing. Rescue (Patient not taking: Reported on 10/13/2023) 18 g 11    EPINEPHrine (EPIPEN 2-DELVIS) 0.3 mg/0.3 mL AtIn Inject 0.3 mLs (0.3 mg total) into the muscle once as needed (Allergic reaction). 2 each 3    norelgestromin-ethinyl estradiol (XULANE) 150-35 mcg/24 hr Place 1 patch onto the skin once a week. 4 patch 11    triamcinolone acetonide 0.1% (KENALOG) 0.1 % cream Apply topically 2 (two) times daily. for 7 days 80 g 0     Current Facility-Administered Medications on File Prior to Visit   Medication Dose Route Frequency Provider Last Rate Last Admin    levonorgestreL (FLORIDALMA) 14 mcg/24 hrs (3 yrs) 13.5 mg IUD 14 mcg  14 mcg Intrauterine  Katarzyna Thomas MD   14 mcg at 12/20/23 1345     Family History   Problem Relation Age of Onset    Hypertension Mother     Osteoporosis Maternal Aunt     Bone cancer Paternal Uncle     Hypertension Maternal Grandmother     Arthritis Maternal Grandmother     Dementia Maternal Grandmother     Hypertension Paternal Grandmother      Diabetes Paternal Grandmother            [facility] Peq Odvv Intake    12/31/2023  1:51 PM CST - Filed by Patient   Describe your reason for todays visit Concern about IUD following insertion   What is your current physical [address] in the event of a medical emergency[address] [facility] [address]   Are you able to take your vital signs? No   Please attach any relevant images or files          32 yo F located in [place] had IUD placed 12/20 by obgyn. States she has had intercourse several times with her  since insertion and is experiencing constant vaginal burning and spotting. She describes the spotting as minimal. Denies abdominal pain, pelvic pain, vaginal discharge, itching, urinary symptoms. She took ibupfrofen the first few days after insertion which relieved the cramping. The cramping has since resolved. She denies hx of BV, states she had one yeast infection earlier this year. This is her first IUD, in the past she used oral contraceptive pills.Denies painful intercourse        Constitution: Negative for chills, sweating, fatigue and fever.   Gastrointestinal:  Negative for abdominal pain, abdominal bloating, nausea, vomiting, constipation and diarrhea.   Genitourinary:  Positive for vaginal pain and vaginal bleeding. Negative for dysuria, frequency, urgency, urine decreased, flank pain, bladder incontinence, bed wetting, hematuria, heavy menstrual bleeding, genital trauma, vaginal discharge, vaginal odor, painful intercourse, genital sore and pelvic pain.        Objective:   The physical exam was conducted virtually.  Physical Exam   Constitutional: She is oriented to person, place, and time.   HENT:   Head: Normocephalic and atraumatic.   Eyes: Conjunctivae are normal. Pupils are equal, round, and reactive to light.   Abdominal: Normal appearance.   Neurological: She is alert and oriented to person, place, and time.   Skin: Skin is warm and dry.       Assessment:     1. Vaginal discomfort    2.  Vaginal bleeding        Plan:   Recommended pt follow up with gyn for pelvic exam for string check of IUD. Discussed worrisome symptoms such as worsening pain, pelvic pain, heavy bleeding, abdominal pain, nausea, vomiting which could indicate ectopic pregnancy, sepsis, etc and would need urgent evaluation such as ER. Pt verbalized understanding. Pain is very minimal, describes more like discomfort and no pain with intercourse. Discussed possibly body still getting used to IUD. Pt plans to follow up with gyn this week. No indications for BV, yeast, or UTI infection today.    Vaginal discomfort    Vaginal bleeding

## 2024-01-12 ENCOUNTER — PATIENT OUTREACH (OUTPATIENT)
Dept: ADMINISTRATIVE | Facility: HOSPITAL | Age: 34
End: 2024-01-12
Payer: COMMERCIAL

## 2024-01-12 NOTE — PROGRESS NOTES
Population Health Chart Review & Patient Outreach Details   Dr. Zartae no longer with [facility],need establish care with another provider - need to schedule phyiscal. If want to follow Dr Zarate can go to [facility] at [address] [place] [address] [address], [place] [address] - phone: [phone]. -- pt will be moving out of [facility] and will follow up physical at [facility].       Further Action Needed If Patient Returns Outreach:            Updates Requested / Reviewed:     [x]  Care Everywhere    []     []  External Sources ([facility], [facility], [facility], etc.)    [] [facility]   [] [facility]   [] Other:    [x]  Care Team Updated   []  Removed  or Duplicate Orders   []  Immunization Reconciliation Completed / Queried    [] [place]   [] [place]   [] [place]   [] [place]      Health Maintenance Topics Addressed and Outreach Outcomes / Actions Taken:             Breast Cancer Screening []  Mammogram Order Placed    []  Mammogram Screening Scheduled    []  External Records Requested & Care Team Updated if Applicable    []  External Records Uploaded & Care Team Updated if Applicable    []  Pt Declined Scheduling Mammogram    []  Pt Will Schedule with External Provider / Order Routed & Care Team Updated if Applicable              Cervical Cancer Screening []  Pap Smear Scheduled in Primary Care or OBGYN    []  External Records Requested & Care Team Updated if Applicable       []  External Records Uploaded, Care Team Updated, & History Updated if Applicable    []  Patient Declined Scheduling Pap Smear    []  Patient Will Schedule with External Provider & Care Team Updated if Applicable                  Colorectal Cancer Screening []  Colonoscopy Case Request / Referral / Home Test Order Placed    []  External Records Requested & Care Team Updated if Applicable    []  External Records Uploaded, Care Team Updated, & History Updated if Applicable    []  Patient Declined Completing Colon Cancer  Screening    []  Patient Will Schedule with External Provider & Care Team Updated if Applicable    []  Fit Kit Mailed (add the SmartPhrase under additional notes)    []  Reminded Patient to Complete Home Test                Diabetic Eye Exam []  Eye Exam Screening Order Placed    []  Eye Camera Scheduled or Optometry/Ophthalmology Referral Placed    []  External Records Requested & Care Team Updated if Applicable    []  External Records Uploaded, Care Team Updated, & History Updated if Applicable    []  Patient Declined Scheduling Eye Exam    []  Patient Will Schedule with External Provider & Care Team Updated if Applicable             Blood Pressure Control []  Primary Care Follow Up Visit Scheduled     []  Remote Blood Pressure Reading Captured    []  Patient Declined Remote Reading or Scheduling Appt - Escalated to PCP    []  Patient Will Call Back or Send Portal Message with Reading                 HbA1c & Other Labs []  Overdue Lab(s) Ordered    []  Overdue Lab(s) Scheduled    []  External Records Uploaded & Care Team Updated if Applicable    []  Primary Care Follow Up Visit Scheduled     []  Reminded Patient to Complete A1c Home Test    []  Patient Declined Scheduling Labs or Will Call Back to Schedule    []  Patient Will Schedule with External Provider / Order Routed, & Care Team Updated if Applicable           Primary Care Appointment []  Primary Care Appt Scheduled    []  Patient Declined Scheduling or Will Call Back to Schedule    []  Pt Established with External Provider, Updated Care Team, & Informed Pt to Notify Payor if Applicable           Medication Adherence /    Statin Use []  Primary Care Appointment Scheduled    []  Patient Reminded to  Prescription    []  Patient Declined, Provider Notified if Needed    []  Sent Provider Message to Review to Evaluate Pt for Statin, Add Exclusion Dx Codes, Document   Exclusion in Problem List, Change Statin Intensity Level to Moderate or High Intensity if  Applicable                Osteoporosis Screening []  Dexa Order Placed    []  Dexa Appointment Scheduled    []  External Records Requested & Care Team Updated    []  External Records Uploaded, Care Team Updated, & History Updated if Applicable    []  Patient Declined Scheduling Dexa or Will Call Back to Schedule    []  Patient Will Schedule with External Provider / Order Routed & Care Team Updated if Applicable       Additional Notes:

## 2024-02-29 ENCOUNTER — OFFICE VISIT (OUTPATIENT)
Dept: PRIMARY CARE CLINIC | Facility: CLINIC | Age: 34
End: 2024-02-29
Payer: COMMERCIAL

## 2024-02-29 VITALS
DIASTOLIC BLOOD PRESSURE: 73 MMHG | SYSTOLIC BLOOD PRESSURE: 122 MMHG | BODY MASS INDEX: 26.19 KG/M2 | OXYGEN SATURATION: 98 % | HEART RATE: 89 BPM | HEIGHT: 68 IN | WEIGHT: 172.81 LBS

## 2024-02-29 DIAGNOSIS — N94.10 DYSPAREUNIA IN FEMALE: ICD-10-CM

## 2024-02-29 DIAGNOSIS — F41.1 GENERALIZED ANXIETY DISORDER: ICD-10-CM

## 2024-02-29 DIAGNOSIS — Z00.00 ANNUAL PHYSICAL EXAM: Primary | ICD-10-CM

## 2024-02-29 DIAGNOSIS — L30.9 ECZEMA, UNSPECIFIED TYPE: ICD-10-CM

## 2024-02-29 DIAGNOSIS — J45.20 MILD INTERMITTENT ASTHMA WITHOUT COMPLICATION: ICD-10-CM

## 2024-02-29 PROCEDURE — 3074F SYST BP LT 130 MM HG: CPT | Mod: CPTII,S$GLB,, | Performed by: STUDENT IN AN ORGANIZED HEALTH CARE EDUCATION/TRAINING PROGRAM

## 2024-02-29 PROCEDURE — 3008F BODY MASS INDEX DOCD: CPT | Mod: CPTII,S$GLB,, | Performed by: STUDENT IN AN ORGANIZED HEALTH CARE EDUCATION/TRAINING PROGRAM

## 2024-02-29 PROCEDURE — 99999 PR PBB SHADOW E&M-EST. PATIENT-LVL V: CPT | Mod: PBBFAC,,, | Performed by: STUDENT IN AN ORGANIZED HEALTH CARE EDUCATION/TRAINING PROGRAM

## 2024-02-29 PROCEDURE — 1159F MED LIST DOCD IN RCRD: CPT | Mod: CPTII,S$GLB,, | Performed by: STUDENT IN AN ORGANIZED HEALTH CARE EDUCATION/TRAINING PROGRAM

## 2024-02-29 PROCEDURE — 1160F RVW MEDS BY RX/DR IN RCRD: CPT | Mod: CPTII,S$GLB,, | Performed by: STUDENT IN AN ORGANIZED HEALTH CARE EDUCATION/TRAINING PROGRAM

## 2024-02-29 PROCEDURE — 3078F DIAST BP <80 MM HG: CPT | Mod: CPTII,S$GLB,, | Performed by: STUDENT IN AN ORGANIZED HEALTH CARE EDUCATION/TRAINING PROGRAM

## 2024-02-29 PROCEDURE — 99385 PREV VISIT NEW AGE 18-39: CPT | Mod: S$GLB,,, | Performed by: STUDENT IN AN ORGANIZED HEALTH CARE EDUCATION/TRAINING PROGRAM

## 2024-02-29 RX ORDER — DROSPIRENONE AND ETHINYL ESTRADIOL 0.03MG-3MG
KIT ORAL
COMMUNITY
Start: 2024-01-10

## 2024-02-29 NOTE — PROGRESS NOTES
Office visit  Patient: Evelia Ramon   2/29/2024     Assessment:     1. Annual physical exam    2. Mild intermittent asthma without complication    3. Generalized anxiety disorder    4. Dyspareunia in female    5. Eczema, unspecified type      Plan:            1. Annual physical exam  -     TSH; Future; Expected date: 02/29/2024  -     Lipid Panel; Future; Expected date: 02/29/2024  -     Comprehensive Metabolic Panel; Future; Expected date: 02/29/2024  -     CBC Auto Differential; Future; Expected date: 02/29/2024  -     HEMOGLOBIN A1C; Future; Expected date: 02/29/2024  -Discussed healthy habits and recommended preventative screening    2. Mild intermittent asthma without complication       -Stable, continue albuterol as needed    3. Generalized anxiety disorder  -     Ambulatory referral/consult to Psychiatry; Future; Expected date: 03/07/2024        -continue therapy    4. Dyspareunia in female  -     Ambulatory referral/consult to Physical/Occupational Therapy; Future; Expected date: 03/07/2024        -patient reports good results from pelvic floor PT - to reach back out to them for further treatment when patient has time    5. Eczema, unspecified type  -     Ambulatory referral/consult to Dermatology; Future; Expected date: 03/07/2024        -continue steroid cream      CHIEF COMPLAINT: establish care and check up    HPI: Evelia Ramon is a 33 y.o. female who presents for an annual physical. She would like to establish care. She recently moved from [place].    She reports severe pain on New Year's Esmer from her IUD. The pain got better after a few days.  Since then, she has had no pain or discharge.          Current Outpatient Medications   Medication Instructions    albuterol (PROAIR HFA) 90 mcg/actuation inhaler 2 puffs, Inhalation, Every 6 hours PRN, Rescue    drospirenone-ethinyl estradioL (MONICA) 3-0.03 mg per tablet     EPINEPHrine (EPIPEN 2-DELVIS) 0.3 mg, Intramuscular, Once as needed     "triamcinolone acetonide 0.1% (KENALOG) 0.1 % cream Topical (Top), 2 times daily       No results found for: "HGBA1C"  No results found for: "MICALBCREAT"  Lab Results   Component Value Date    CHOL 195 06/12/2020       Lab Results   Component Value Date     08/04/2021    K 3.8 08/04/2021     06/12/2020    CO2 27 08/04/2021    GLU 76 06/12/2020    BUN 9.0 08/04/2021    CREATININE 0.83 08/04/2021    CALCIUM 8.6 08/04/2021    PROT 8.1 06/12/2020    ALBUMIN 3.8 08/04/2021    BILITOT 0.4 08/04/2021    ALKPHOS 56 06/12/2020    AST 15 08/04/2021    ALT 10 08/04/2021    ANIONGAP 7 (L) 08/04/2021    ESTGFRAFRICA >105 08/04/2021    EGFRNONAA >60.0 06/12/2020    WBC 5.86 09/25/2020    HGB 12.7 09/25/2020    HGB 11.7 (L) 06/12/2020    HCT 41.7 09/25/2020    MCV 93 09/25/2020     09/25/2020       No results found for: "LH", "FSH", "TOTALTESTOST", "PROGESTERONE", "ESTRADIOL", "[id number]", "VMPOJXTB39", "FERRITIN", "IRON", "TRANSFERRIN", "TIBC", "FESATURATED", "ZINC"      Past Medical History:   Diagnosis Date    Allergy     Anemia     Asthma     Eczema     PCOS (polycystic ovarian syndrome)      Past Surgical History:   Procedure Laterality Date    CERVICAL CERCLAGE      WISDOM TOOTH EXTRACTION       Vitals:    02/29/24 1536   BP: 122/73   Pulse: 89   SpO2: 98%   Weight: 78.4 kg (172 lb 13.5 oz)   Height: 5' 7.5" (1.715 m)   PainSc: 0-No pain     Objective:   Physical Exam  Vitals reviewed.   Constitutional:       General: She is not in acute distress.     Appearance: Normal appearance. She is well-developed.   HENT:      Head: Normocephalic and atraumatic.      Right Ear: Tympanic membrane, ear canal and external ear normal.      Left Ear: Tympanic membrane, ear canal and external ear normal.      Nose: Nose normal.      Mouth/Throat:      Mouth: Mucous membranes are moist.      Pharynx: No oropharyngeal exudate or posterior oropharyngeal erythema.   Eyes:      General: No scleral icterus.        Right eye: " No discharge.         Left eye: No discharge.      Conjunctiva/sclera: Conjunctivae normal.   Neck:      Thyroid: No thyromegaly or thyroid tenderness.   Cardiovascular:      Rate and Rhythm: Normal rate and regular rhythm.      Heart sounds: Normal heart sounds. No murmur heard.     No friction rub. No gallop.   Pulmonary:      Effort: Pulmonary effort is normal. No respiratory distress.      Breath sounds: Normal breath sounds. No wheezing, rhonchi or rales.   Musculoskeletal:         General: No deformity.      Right lower leg: No edema.      Left lower leg: No edema.   Lymphadenopathy:      Cervical: No cervical adenopathy.   Skin:     General: Skin is warm and dry.   Neurological:      General: No focal deficit present.      Mental Status: She is alert and oriented to person, place, and time.   Psychiatric:         Mood and Affect: Mood normal.         Behavior: Behavior normal.         Thought Content: Thought content normal.             Hilda Box MD  Internal Medicine and Pediatrics

## 2024-04-09 ENCOUNTER — LAB VISIT (OUTPATIENT)
Dept: LAB | Facility: HOSPITAL | Age: 34
End: 2024-04-09
Attending: STUDENT IN AN ORGANIZED HEALTH CARE EDUCATION/TRAINING PROGRAM
Payer: COMMERCIAL

## 2024-04-09 DIAGNOSIS — Z00.00 ANNUAL PHYSICAL EXAM: ICD-10-CM

## 2024-04-09 LAB
ALBUMIN SERPL BCP-MCNC: 3.8 G/DL (ref 3.5–5.2)
ALP SERPL-CCNC: 62 U/L (ref 55–135)
ALT SERPL W/O P-5'-P-CCNC: 22 U/L (ref 10–44)
ANION GAP SERPL CALC-SCNC: 7 MMOL/L (ref 8–16)
AST SERPL-CCNC: 21 U/L (ref 10–40)
BASOPHILS # BLD AUTO: 0.03 K/UL (ref 0–0.2)
BASOPHILS NFR BLD: 0.6 % (ref 0–1.9)
BILIRUB SERPL-MCNC: 0.5 MG/DL (ref 0.1–1)
BUN SERPL-MCNC: 7 MG/DL (ref 6–20)
CALCIUM SERPL-MCNC: 9.3 MG/DL (ref 8.7–10.5)
CHLORIDE SERPL-SCNC: 107 MMOL/L (ref 95–110)
CHOLEST SERPL-MCNC: 156 MG/DL (ref 120–199)
CHOLEST/HDLC SERPL: 3.3 {RATIO} (ref 2–5)
CO2 SERPL-SCNC: 26 MMOL/L (ref 23–29)
CREAT SERPL-MCNC: 0.9 MG/DL (ref 0.5–1.4)
DIFFERENTIAL METHOD BLD: ABNORMAL
EOSINOPHIL # BLD AUTO: 0.2 K/UL (ref 0–0.5)
EOSINOPHIL NFR BLD: 3.9 % (ref 0–8)
ERYTHROCYTE [DISTWIDTH] IN BLOOD BY AUTOMATED COUNT: 12.9 % (ref 11.5–14.5)
EST. GFR  (NO RACE VARIABLE): >60 ML/MIN/1.73 M^2
ESTIMATED AVG GLUCOSE: 108 MG/DL (ref 68–131)
GLUCOSE SERPL-MCNC: 81 MG/DL (ref 70–110)
HBA1C MFR BLD: 5.4 % (ref 4–5.6)
HCT VFR BLD AUTO: 34 % (ref 37–48.5)
HDLC SERPL-MCNC: 48 MG/DL (ref 40–75)
HDLC SERPL: 30.8 % (ref 20–50)
HGB BLD-MCNC: 10.9 G/DL (ref 12–16)
IMM GRANULOCYTES # BLD AUTO: 0.01 K/UL (ref 0–0.04)
IMM GRANULOCYTES NFR BLD AUTO: 0.2 % (ref 0–0.5)
LDLC SERPL CALC-MCNC: 100.2 MG/DL (ref 63–159)
LYMPHOCYTES # BLD AUTO: 1.9 K/UL (ref 1–4.8)
LYMPHOCYTES NFR BLD: 36.3 % (ref 18–48)
MCH RBC QN AUTO: 28.7 PG (ref 27–31)
MCHC RBC AUTO-ENTMCNC: 32.1 G/DL (ref 32–36)
MCV RBC AUTO: 90 FL (ref 82–98)
MONOCYTES # BLD AUTO: 0.3 K/UL (ref 0.3–1)
MONOCYTES NFR BLD: 6.4 % (ref 4–15)
NEUTROPHILS # BLD AUTO: 2.8 K/UL (ref 1.8–7.7)
NEUTROPHILS NFR BLD: 52.6 % (ref 38–73)
NONHDLC SERPL-MCNC: 108 MG/DL
NRBC BLD-RTO: 0 /100 WBC
PLATELET # BLD AUTO: 228 K/UL (ref 150–450)
PMV BLD AUTO: 11 FL (ref 9.2–12.9)
POTASSIUM SERPL-SCNC: 3.6 MMOL/L (ref 3.5–5.1)
PROT SERPL-MCNC: 7.3 G/DL (ref 6–8.4)
RBC # BLD AUTO: 3.8 M/UL (ref 4–5.4)
SODIUM SERPL-SCNC: 140 MMOL/L (ref 136–145)
TRIGL SERPL-MCNC: 39 MG/DL (ref 30–150)
TSH SERPL DL<=0.005 MIU/L-ACNC: 1.77 UIU/ML (ref 0.4–4)
WBC # BLD AUTO: 5.35 K/UL (ref 3.9–12.7)

## 2024-04-09 PROCEDURE — 36415 COLL VENOUS BLD VENIPUNCTURE: CPT | Mod: PN | Performed by: STUDENT IN AN ORGANIZED HEALTH CARE EDUCATION/TRAINING PROGRAM

## 2024-04-09 PROCEDURE — 84443 ASSAY THYROID STIM HORMONE: CPT | Performed by: STUDENT IN AN ORGANIZED HEALTH CARE EDUCATION/TRAINING PROGRAM

## 2024-04-09 PROCEDURE — 83036 HEMOGLOBIN GLYCOSYLATED A1C: CPT | Performed by: STUDENT IN AN ORGANIZED HEALTH CARE EDUCATION/TRAINING PROGRAM

## 2024-04-09 PROCEDURE — 80061 LIPID PANEL: CPT | Performed by: STUDENT IN AN ORGANIZED HEALTH CARE EDUCATION/TRAINING PROGRAM

## 2024-04-09 PROCEDURE — 80053 COMPREHEN METABOLIC PANEL: CPT | Performed by: STUDENT IN AN ORGANIZED HEALTH CARE EDUCATION/TRAINING PROGRAM

## 2024-04-09 PROCEDURE — 85025 COMPLETE CBC W/AUTO DIFF WBC: CPT | Performed by: STUDENT IN AN ORGANIZED HEALTH CARE EDUCATION/TRAINING PROGRAM

## 2024-04-10 DIAGNOSIS — D64.9 ANEMIA, UNSPECIFIED TYPE: Primary | ICD-10-CM

## 2024-04-12 ENCOUNTER — OFFICE VISIT (OUTPATIENT)
Dept: OBSTETRICS AND GYNECOLOGY | Facility: CLINIC | Age: 34
End: 2024-04-12
Payer: COMMERCIAL

## 2024-04-12 VITALS — DIASTOLIC BLOOD PRESSURE: 70 MMHG | WEIGHT: 172.38 LBS | SYSTOLIC BLOOD PRESSURE: 120 MMHG | BODY MASS INDEX: 26.6 KG/M2

## 2024-04-12 DIAGNOSIS — Z30.431 IUD CHECK UP: Primary | ICD-10-CM

## 2024-04-12 PROCEDURE — 3074F SYST BP LT 130 MM HG: CPT | Mod: CPTII,S$GLB,, | Performed by: OBSTETRICS & GYNECOLOGY

## 2024-04-12 PROCEDURE — 3044F HG A1C LEVEL LT 7.0%: CPT | Mod: CPTII,S$GLB,, | Performed by: OBSTETRICS & GYNECOLOGY

## 2024-04-12 PROCEDURE — 1159F MED LIST DOCD IN RCRD: CPT | Mod: CPTII,S$GLB,, | Performed by: OBSTETRICS & GYNECOLOGY

## 2024-04-12 PROCEDURE — 3078F DIAST BP <80 MM HG: CPT | Mod: CPTII,S$GLB,, | Performed by: OBSTETRICS & GYNECOLOGY

## 2024-04-12 PROCEDURE — 3008F BODY MASS INDEX DOCD: CPT | Mod: CPTII,S$GLB,, | Performed by: OBSTETRICS & GYNECOLOGY

## 2024-04-12 PROCEDURE — 99999 PR PBB SHADOW E&M-EST. PATIENT-LVL III: CPT | Mod: PBBFAC,,, | Performed by: OBSTETRICS & GYNECOLOGY

## 2024-04-12 PROCEDURE — 99212 OFFICE O/P EST SF 10 MIN: CPT | Mod: S$GLB,,, | Performed by: OBSTETRICS & GYNECOLOGY

## 2024-04-12 NOTE — PROGRESS NOTES
Subjective     Patient ID: Evelia Ramon is a 33 y.o. female.    Chief Complaint:  IUD Check      History of Present Illness  HPI  Here for IUD check.  Had Ingris inserted.  Happy with IUD.    GYN & OB History  Patient's last menstrual period was 2024.   Date of Last Pap: 10/18/2023    OB History    Para Term  AB Living   2 2 2     2   SAB IAB Ectopic Multiple Live Births           2      # Outcome Date GA Lbr Cheng/2nd Weight Sex Delivery Anes PTL Lv   2 Term 21 40w1d / 00:04 3.54 kg (7 lb 12.9 oz) M Vag-Spont EPI N MARYAM   1 Term 18 39w0d 04:35 / 00:39 3.225 kg (7 lb 1.8 oz) F Vag-Vacuum EPI N MARYAM       Review of Systems  Review of Systems   Constitutional: Negative.    HENT: Negative.     Eyes: Negative.    Respiratory: Negative.     Cardiovascular: Negative.    Gastrointestinal: Negative.    Endocrine: Negative.    Genitourinary: Negative.    Musculoskeletal: Negative.    Integumentary:  Negative.   Neurological: Negative.    Hematological: Negative.    Psychiatric/Behavioral: Negative.     Breast: negative.           Objective   Physical Exam:   Constitutional: She is oriented to person, place, and time. She appears well-developed.    HENT:   Head: Normocephalic and atraumatic.    Eyes: EOM are normal.     Cardiovascular:  Normal rate.             Pulmonary/Chest: Effort normal.        Abdominal: Soft. There is no abdominal tenderness.     Genitourinary:    Vagina, uterus, right adnexa and left adnexa normal.      Pelvic exam was performed with patient supine.   The external female genitalia was normal.   No external genitalia lesions identified,Genitalia hair distrobution normal .     Labial bartholins normal.There is no rash, tenderness or lesion on the right labia. There is no rash, tenderness or lesion on the left labia. Cervix is normal. Right adnexum displays no tenderness and no fullness. Left adnexum displays no tenderness and no fullness. No erythema, vaginal discharge  or bleeding in the vagina.    No signs of injury in the vagina.   Vagina was moist.Cervix exhibits no motion tenderness. Uterus consistancy normal and Uerus contour normal  Uterus is not enlarged and not tender. Normal urethral meatus.Urethral Meatus exhibits: urethral lesionUrethra findings: no urethral mass, no tenderness and prolapsedBladder findings: no bladder distention and no bladder tendernessIUD strings visualized.          Musculoskeletal: Normal range of motion.       Neurological: She is oriented to person, place, and time.    Skin: Skin is warm.    Psychiatric: She has a normal mood and affect.            Assessment and Plan     1. IUD check up             Plan:  - strings visualized.  - follow up for annual exam or prn.

## 2024-04-19 ENCOUNTER — NURSE TRIAGE (OUTPATIENT)
Dept: ADMINISTRATIVE | Facility: CLINIC | Age: 34
End: 2024-04-19
Payer: COMMERCIAL

## 2024-04-19 NOTE — TELEPHONE ENCOUNTER
Pt was exposed to peanut butter earlier in the day and has a severe allergy to peanuts that she states was dx in adolescence. Pt reports that her children got into peanut butter and she had to clean them off. Some peanut butter got on her arm. She is now experiencing itching of that arm, aching of the arm, and tightness in her chest. She denies hx of anaphylaxis. Denies any difficulty breathing or swallowing. Denies facial swelling.    Per Care Advice, pt is advised to go to ED now. Pt verbalizes understanding and is instructed to call back with any new/worsening sxs, questions, or concerns.   Reason for Disposition   Patient sounds very sick or weak to the triager    Additional Information   Negative: Life-threatening reaction in the past to similar substance (e.g., food, insect bite/sting, medication, etc.) and < 2 hours since exposure   Negative: Wheezing, stridor, hoarseness, or difficulty breathing   Negative: Tightness in the chest or throat and begins within 2 hours of exposure to allergic substance   Negative: Difficulty swallowing, drooling, or slurred speech   Negative: Difficult to awaken or acting confused (e.g., disoriented, slurred speech)   Negative: Unresponsive, passed out, or very weak   Negative: Other symptom of severe allergic reaction (Exception: Hives or facial swelling alone.)   Negative: Sounds like a life-threatening emergency to the triager   Negative: Widespread hives and onset > 2 hours after exposure to high-risk allergen (e.g., sting, nuts, 1st dose of antibiotic)   Negative: Face swelling and onset > 2 hours after exposure to high-risk allergen (e.g., sting, nuts, 1st dose of antibiotic)   Widespread itching and onset > 2 hours after exposure to high-risk allergen (e.g., sting, nuts, 1st dose of antibiotic)   Negative: Life-threatening reaction (anaphylaxis) in the past to similar substance (e.g., food, insect bite/sting, chemical, etc.) and < 2 hours since exposure   Negative:  Difficulty breathing or wheezing   Negative: Difficulty swallowing or slurred speech and sudden onset   Negative: Sounds like a life-threatening emergency to the triager    Protocols used: [id number], Itching - Widespread-A-OH

## 2024-07-27 ENCOUNTER — PATIENT MESSAGE (OUTPATIENT)
Dept: URGENT CARE | Facility: CLINIC | Age: 34
End: 2024-07-27
Payer: COMMERCIAL

## 2024-09-19 ENCOUNTER — TELEPHONE (OUTPATIENT)
Dept: PRIMARY CARE CLINIC | Facility: CLINIC | Age: 34
End: 2024-09-19
Payer: COMMERCIAL

## 2024-09-19 NOTE — TELEPHONE ENCOUNTER
I sp w supervisor Melly Quinn and learned Dr. Box can accept pediatric pts with commercial ins and not Medicaid. Pt states one child has commercial and one has Medicaid. We agreed it would be inconvenient to only have one child established, pt given the number for Pediatrics for scheduling assistance.

## 2024-09-19 NOTE — TELEPHONE ENCOUNTER
----- Message from Kady Smith sent at 9/19/2024  8:28 AM CDT -----  Contact: Patient   [phone]  [email or web address]     Patient is calling for Medical Advice regarding:  Patient would like to have Dr TATUM Care for her 2 children as their Pediatrician      How long has patient had these symptoms:rosangela    Pharmacy name and phone#:na    Patient wants a call back or thru [facility]:  Call to schedule    Comments: Dr TATUM told her she does this     Patient   [phone]    Please advise patient replies from provider may take up to 48 hours.

## 2024-11-05 ENCOUNTER — TELEPHONE (OUTPATIENT)
Dept: FAMILY MEDICINE | Facility: CLINIC | Age: 34
End: 2024-11-05
Payer: COMMERCIAL

## 2024-11-06 ENCOUNTER — OFFICE VISIT (OUTPATIENT)
Dept: PRIMARY CARE CLINIC | Facility: CLINIC | Age: 34
End: 2024-11-06
Payer: COMMERCIAL

## 2024-11-06 ENCOUNTER — LAB VISIT (OUTPATIENT)
Dept: LAB | Facility: HOSPITAL | Age: 34
End: 2024-11-06
Attending: STUDENT IN AN ORGANIZED HEALTH CARE EDUCATION/TRAINING PROGRAM
Payer: COMMERCIAL

## 2024-11-06 VITALS
HEART RATE: 84 BPM | BODY MASS INDEX: 26.69 KG/M2 | HEIGHT: 68 IN | SYSTOLIC BLOOD PRESSURE: 118 MMHG | DIASTOLIC BLOOD PRESSURE: 70 MMHG | WEIGHT: 176.13 LBS | OXYGEN SATURATION: 98 % | TEMPERATURE: 99 F

## 2024-11-06 DIAGNOSIS — L30.9 ECZEMA, UNSPECIFIED TYPE: ICD-10-CM

## 2024-11-06 DIAGNOSIS — L81.9 HYPERPIGMENTATION OF SKIN: ICD-10-CM

## 2024-11-06 DIAGNOSIS — D64.9 ANEMIA, UNSPECIFIED TYPE: ICD-10-CM

## 2024-11-06 DIAGNOSIS — E28.2 PCOS (POLYCYSTIC OVARIAN SYNDROME): ICD-10-CM

## 2024-11-06 DIAGNOSIS — L20.84 INTRINSIC ECZEMA: ICD-10-CM

## 2024-11-06 DIAGNOSIS — M25.561 ACUTE PAIN OF RIGHT KNEE: Primary | ICD-10-CM

## 2024-11-06 DIAGNOSIS — Z91.018 FOOD ALLERGY: ICD-10-CM

## 2024-11-06 DIAGNOSIS — M54.50 CHRONIC MIDLINE LOW BACK PAIN, UNSPECIFIED WHETHER SCIATICA PRESENT: ICD-10-CM

## 2024-11-06 DIAGNOSIS — J30.2 SEASONAL ALLERGIES: ICD-10-CM

## 2024-11-06 DIAGNOSIS — G89.29 CHRONIC MIDLINE LOW BACK PAIN, UNSPECIFIED WHETHER SCIATICA PRESENT: ICD-10-CM

## 2024-11-06 LAB
FERRITIN SERPL-MCNC: 47 NG/ML (ref 20–300)
IRON SERPL-MCNC: 66 UG/DL (ref 30–160)
SATURATED IRON: 21 % (ref 20–50)
TOTAL IRON BINDING CAPACITY: 321 UG/DL (ref 250–450)
TRANSFERRIN SERPL-MCNC: 217 MG/DL (ref 200–375)

## 2024-11-06 PROCEDURE — 99214 OFFICE O/P EST MOD 30 MIN: CPT | Mod: S$GLB,,, | Performed by: STUDENT IN AN ORGANIZED HEALTH CARE EDUCATION/TRAINING PROGRAM

## 2024-11-06 PROCEDURE — 36415 COLL VENOUS BLD VENIPUNCTURE: CPT | Mod: PN | Performed by: STUDENT IN AN ORGANIZED HEALTH CARE EDUCATION/TRAINING PROGRAM

## 2024-11-06 PROCEDURE — 3078F DIAST BP <80 MM HG: CPT | Mod: CPTII,S$GLB,, | Performed by: STUDENT IN AN ORGANIZED HEALTH CARE EDUCATION/TRAINING PROGRAM

## 2024-11-06 PROCEDURE — 83540 ASSAY OF IRON: CPT | Performed by: STUDENT IN AN ORGANIZED HEALTH CARE EDUCATION/TRAINING PROGRAM

## 2024-11-06 PROCEDURE — 82728 ASSAY OF FERRITIN: CPT | Performed by: STUDENT IN AN ORGANIZED HEALTH CARE EDUCATION/TRAINING PROGRAM

## 2024-11-06 PROCEDURE — 99999 PR PBB SHADOW E&M-EST. PATIENT-LVL V: CPT | Mod: PBBFAC,,, | Performed by: STUDENT IN AN ORGANIZED HEALTH CARE EDUCATION/TRAINING PROGRAM

## 2024-11-06 PROCEDURE — 3074F SYST BP LT 130 MM HG: CPT | Mod: CPTII,S$GLB,, | Performed by: STUDENT IN AN ORGANIZED HEALTH CARE EDUCATION/TRAINING PROGRAM

## 2024-11-06 PROCEDURE — 1160F RVW MEDS BY RX/DR IN RCRD: CPT | Mod: CPTII,S$GLB,, | Performed by: STUDENT IN AN ORGANIZED HEALTH CARE EDUCATION/TRAINING PROGRAM

## 2024-11-06 PROCEDURE — 3008F BODY MASS INDEX DOCD: CPT | Mod: CPTII,S$GLB,, | Performed by: STUDENT IN AN ORGANIZED HEALTH CARE EDUCATION/TRAINING PROGRAM

## 2024-11-06 PROCEDURE — 3044F HG A1C LEVEL LT 7.0%: CPT | Mod: CPTII,S$GLB,, | Performed by: STUDENT IN AN ORGANIZED HEALTH CARE EDUCATION/TRAINING PROGRAM

## 2024-11-06 PROCEDURE — 1159F MED LIST DOCD IN RCRD: CPT | Mod: CPTII,S$GLB,, | Performed by: STUDENT IN AN ORGANIZED HEALTH CARE EDUCATION/TRAINING PROGRAM

## 2024-11-06 RX ORDER — TRIAMCINOLONE ACETONIDE 1 MG/G
CREAM TOPICAL 2 TIMES DAILY
Qty: 80 G | Refills: 5 | Status: SHIPPED | OUTPATIENT
Start: 2024-11-06 | End: 2024-11-13

## 2024-11-06 NOTE — PROGRESS NOTES
Office visit  Patient: Evelia Ramon   11/6/2024       Assessment/Plan:       1. Acute pain of right knee  -     Ambulatory referral/consult to Physical/Occupational Therapy; Future; Expected date: 11/13/2024    2. Food allergy  -     Ambulatory referral/consult to Allergy; Future; Expected date: 11/13/2024    3. Seasonal allergies  -     Ambulatory referral/consult to Allergy; Future; Expected date: 11/13/2024    4. Eczema, unspecified type  -     Ambulatory referral/consult to Dermatology; Future; Expected date: 11/06/2024    5. Hyperpigmentation of skin  -     Ambulatory referral/consult to Dermatology; Future; Expected date: 11/06/2024    6. Intrinsic eczema  -     triamcinolone acetonide 0.1% (KENALOG) 0.1 % cream; Apply topically 2 (two) times daily. for 7 days  Dispense: 80 g; Refill: 5    7. Chronic midline low back pain, unspecified whether sciatica present  -     Ambulatory referral/consult to [facility]; Future; Expected date: 11/13/2024    8. PCOS (polycystic ovarian syndrome)  -     Ambulatory referral/consult to Nutrition Services; Future; Expected date: 11/13/2024        CHIEF COMPLAINT: check in    HPI: Evelia Ramon is a 34 y.o. female who presents for referrals.  She would like to see an allergist. As a child, she had severe allergies and received allergy shots.    She reports right knee pain. Has been walking up stairs and skipping steps. After she started that, her right knee started hurting.  She stopped doing every other step, and her knee doesn't hurt as bad.  She still gets occasional pain.  No feel of her knee catching or her knee giving out on her. She denies any history of trauma.    Review of Systems   HENT:  Negative for hearing loss.    Eyes:  Negative for discharge.   Respiratory:  Negative for wheezing.    Cardiovascular:  Negative for chest pain and palpitations.   Gastrointestinal:  Negative for blood in stool, constipation, diarrhea and vomiting.  "  Genitourinary:  Negative for dysuria and hematuria.   Musculoskeletal:  Negative for neck pain.   Neurological:  Negative for weakness and headaches.   Endo/Heme/Allergies:  Negative for polydipsia.     Answers submitted by the patient for this visit:  Review of Systems Questionnaire (Submitted on 11/6/2024)  activity change: No  unexpected weight change: No  rhinorrhea: No  trouble swallowing: No  visual disturbance: No  chest tightness: No  polyuria: No  difficulty urinating: No  menstrual problem: No  joint swelling: No  arthralgias: No  confusion: No  dysphoric mood: No      Current Outpatient Medications   Medication Instructions    albuterol (PROAIR HFA) 90 mcg/actuation inhaler 2 puffs, Inhalation, Every 6 hours PRN, Rescue    EPINEPHrine (EPIPEN 2-DELVIS) 0.3 mg, Intramuscular, Once as needed    triamcinolone acetonide 0.1% (KENALOG) 0.1 % cream Topical (Top), 2 times daily       Lab Results   Component Value Date    HGBA1C 5.4 04/09/2024     No results found for: "MICALBCREAT"  Lab Results   Component Value Date    LDLCALC 100.2 04/09/2024    CHOL 156 04/09/2024    HDL 48 04/09/2024    TRIG 39 04/09/2024       Lab Results   Component Value Date     04/09/2024    K 3.6 04/09/2024     04/09/2024    CO2 26 04/09/2024    GLU 81 04/09/2024    BUN 7 04/09/2024    CREATININE 0.9 04/09/2024    CALCIUM 9.3 04/09/2024    PROT 7.3 04/09/2024    ALBUMIN 3.8 04/09/2024    BILITOT 0.5 04/09/2024    ALKPHOS 62 04/09/2024    AST 21 04/09/2024    ALT 22 04/09/2024    ANIONGAP 7 (L) 04/09/2024    ESTGFRAFRICA >105 08/04/2021    EGFRNONAA >60.0 06/12/2020    WBC 5.35 04/09/2024    HGB 10.9 (L) 04/09/2024    HGB 12.7 09/25/2020    HCT 34.0 (L) 04/09/2024    MCV 90 04/09/2024     04/09/2024    TSH 1.769 04/09/2024       No results found for: "LH", "FSH", "TOTALTESTOST", "PROGESTERONE", "ESTRADIOL", "DIBFFNJI30NS", "[id number]", "FERRITIN", "IRON", "TRANSFERRIN", "TIBC", "FESATURATED", "ZINC"      Past Medical " "History:   Diagnosis Date    Allergy     Anemia     Asthma     Eczema     PCOS (polycystic ovarian syndrome)      Past Surgical History:   Procedure Laterality Date    CERVICAL CERCLAGE      WISDOM TOOTH EXTRACTION         Social History     Tobacco Use    Smoking status: Never     Passive exposure: Never    Smokeless tobacco: Never   Substance Use Topics    Alcohol use: No    Drug use: No       family history includes Arthritis in her maternal grandmother; Bone cancer in her paternal uncle; Dementia in her maternal grandmother; Diabetes in her paternal grandmother; Hypertension in her maternal grandmother, mother, and paternal grandmother; Osteoporosis in her maternal aunt.    Vitals:    11/06/24 1331   BP: 118/70   Pulse: 84   Temp: 99 °F (37.2 °C)   TempSrc: Oral   SpO2: 98%   Weight: 79.9 kg (176 lb 2.4 oz)   Height: 5' 7.5" (1.715 m)   PainSc:   4   PainLoc: Back     Objective:   Physical Exam  Vitals reviewed.   Constitutional:       General: She is not in acute distress.     Appearance: Normal appearance. She is well-developed.   HENT:      Head: Normocephalic and atraumatic.      Right Ear: External ear normal.      Left Ear: External ear normal.      Nose: Nose normal.      Mouth/Throat:      Mouth: Mucous membranes are moist.   Eyes:      General: No scleral icterus.        Right eye: No discharge.         Left eye: No discharge.      Conjunctiva/sclera: Conjunctivae normal.   Cardiovascular:      Rate and Rhythm: Normal rate and regular rhythm.      Heart sounds: Normal heart sounds. No murmur heard.     No friction rub. No gallop.   Pulmonary:      Effort: Pulmonary effort is normal. No respiratory distress.      Breath sounds: Normal breath sounds. No wheezing, rhonchi or rales.   Musculoskeletal:         General: No deformity.      Right lower leg: No edema.      Left lower leg: No edema.   Skin:     General: Skin is warm and dry.   Neurological:      General: No focal deficit present.      Mental " Status: She is alert and oriented to person, place, and time.   Psychiatric:         Mood and Affect: Mood normal.         Behavior: Behavior normal.         Thought Content: Thought content normal.             Hilda Box MD  Internal Medicine and Pediatrics

## 2024-11-11 ENCOUNTER — TELEPHONE (OUTPATIENT)
Dept: PRIMARY CARE CLINIC | Facility: CLINIC | Age: 34
End: 2024-11-11
Payer: COMMERCIAL

## 2024-11-15 ENCOUNTER — PATIENT MESSAGE (OUTPATIENT)
Dept: PRIMARY CARE CLINIC | Facility: CLINIC | Age: 34
End: 2024-11-15

## 2024-11-15 ENCOUNTER — HOSPITAL ENCOUNTER (OUTPATIENT)
Dept: RADIOLOGY | Facility: HOSPITAL | Age: 34
Discharge: HOME | End: 2024-11-15
Attending: STUDENT IN AN ORGANIZED HEALTH CARE EDUCATION/TRAINING PROGRAM
Payer: COMMERCIAL

## 2024-11-15 ENCOUNTER — OFFICE VISIT (OUTPATIENT)
Dept: PRIMARY CARE CLINIC | Facility: CLINIC | Age: 34
End: 2024-11-15
Payer: COMMERCIAL

## 2024-11-15 VITALS
OXYGEN SATURATION: 97 % | DIASTOLIC BLOOD PRESSURE: 88 MMHG | SYSTOLIC BLOOD PRESSURE: 122 MMHG | BODY MASS INDEX: 26.66 KG/M2 | WEIGHT: 175.94 LBS | HEART RATE: 76 BPM | HEIGHT: 68 IN | TEMPERATURE: 99 F

## 2024-11-15 DIAGNOSIS — R05.1 ACUTE COUGH: ICD-10-CM

## 2024-11-15 DIAGNOSIS — R50.9 FEVER, UNSPECIFIED FEVER CAUSE: ICD-10-CM

## 2024-11-15 DIAGNOSIS — J18.9 COMMUNITY ACQUIRED PNEUMONIA, UNSPECIFIED LATERALITY: Primary | ICD-10-CM

## 2024-11-15 PROCEDURE — 71046 X-RAY EXAM CHEST 2 VIEWS: CPT | Mod: 26,,, | Performed by: RADIOLOGY

## 2024-11-15 PROCEDURE — 71046 X-RAY EXAM CHEST 2 VIEWS: CPT | Mod: TC,PN

## 2024-11-15 PROCEDURE — 99999 PR PBB SHADOW E&M-EST. PATIENT-LVL IV: CPT | Mod: PBBFAC,,, | Performed by: STUDENT IN AN ORGANIZED HEALTH CARE EDUCATION/TRAINING PROGRAM

## 2024-11-15 RX ORDER — AMOXICILLIN AND CLAVULANATE POTASSIUM 875; 125 MG/1; MG/1
1 TABLET, FILM COATED ORAL EVERY 12 HOURS
Qty: 14 TABLET | Refills: 0 | Status: SHIPPED | OUTPATIENT
Start: 2024-11-15 | End: 2024-11-22

## 2024-11-15 NOTE — PROGRESS NOTES
"Office visit  Patient: Evelia Ramon   11/15/2024       Assessment/Plan:       1. Community acquired pneumonia, unspecified laterality  -     amoxicillin-clavulanate 875-125mg (AUGMENTIN) 875-125 mg per tablet; Take 1 tablet by mouth every 12 (twelve) hours. for 7 days  Dispense: 14 tablet; Refill: 0        -CXR shows multifocal pneumonia, will treat with 7 days of Augmentin        -Patient to return to clinic if symptoms do not improve or worsen    2. Acute cough  -     X-Ray Chest PA And Lateral; Future; Expected date: 11/15/2024    3. Fever, unspecified fever cause  -     X-Ray Chest PA And Lateral; Future; Expected date: 11/15/2024  -     POCT Influenza A/B Molecular        CHIEF COMPLAINT: chills    HPI: Evelia Ramon is a 34 y.o. female who presents for chills, muscle aches, fatigue, headache, fever for the past week.  Yesterday, her temperature was 99.4 F. Today, it went down.  She's coughing up a lot of sputum.  She reports her nose is draining.  No rhinorrhea or congestion.  She denies diarrhea or vomiting.  She's been taking ibuprofen and Nyquil/Dayquil.  Her daughter is sick.          Current Outpatient Medications   Medication Instructions    albuterol (PROAIR HFA) 90 mcg/actuation inhaler 2 puffs, Inhalation, Every 6 hours PRN, Rescue    amoxicillin-clavulanate 875-125mg (AUGMENTIN) 875-125 mg per tablet 1 tablet, Oral, Every 12 hours    EPINEPHrine (EPIPEN 2-DELVIS) 0.3 mg, Intramuscular, Once as needed    triamcinolone acetonide 0.1% (KENALOG) 0.1 % cream Topical (Top), 2 times daily       Lab Results   Component Value Date    HGBA1C 5.4 04/09/2024     No results found for: "MICALBCREAT"  Lab Results   Component Value Date    LDLCALC 100.2 04/09/2024    CHOL 156 04/09/2024    HDL 48 04/09/2024    TRIG 39 04/09/2024       Lab Results   Component Value Date     04/09/2024    K 3.6 04/09/2024     04/09/2024    CO2 26 04/09/2024    GLU 81 04/09/2024    BUN 7 04/09/2024    CREATININE 0.9 " "04/09/2024    CALCIUM 9.3 04/09/2024    PROT 7.3 04/09/2024    ALBUMIN 3.8 04/09/2024    BILITOT 0.5 04/09/2024    ALKPHOS 62 04/09/2024    AST 21 04/09/2024    ALT 22 04/09/2024    ANIONGAP 7 (L) 04/09/2024    ESTGFRAFRICA >105 08/04/2021    EGFRNONAA >60.0 06/12/2020    WBC 5.35 04/09/2024    HGB 10.9 (L) 04/09/2024    HGB 12.7 09/25/2020    HCT 34.0 (L) 04/09/2024    MCV 90 04/09/2024     04/09/2024    TSH 1.769 04/09/2024       Lab Results   Component Value Date    FERRITIN 47 11/06/2024    IRON 66 11/06/2024    TRANSFERRIN 217 11/06/2024    TIBC 321 11/06/2024    FESATURATED 21 11/06/2024         Past Medical History:   Diagnosis Date    Allergy     Anemia     Asthma     Eczema     PCOS (polycystic ovarian syndrome)      Past Surgical History:   Procedure Laterality Date    CERVICAL CERCLAGE      WISDOM TOOTH EXTRACTION         Social History     Tobacco Use    Smoking status: Never     Passive exposure: Never    Smokeless tobacco: Never   Substance Use Topics    Alcohol use: No    Drug use: No       family history includes Arthritis in her maternal grandmother; Bone cancer in her paternal uncle; Dementia in her maternal grandmother; Diabetes in her paternal grandmother; Hypertension in her maternal grandmother, mother, and paternal grandmother; Osteoporosis in her maternal aunt.    Vitals:    11/15/24 0850   BP: 122/88   Pulse: 76   Temp: 98.9 °F (37.2 °C)   TempSrc: Oral   SpO2: 97%   Weight: 79.8 kg (175 lb 14.8 oz)   Height: 5' 7.5" (1.715 m)   PainSc:   4   PainLoc: Generalized     Objective:   Physical Exam  Vitals reviewed.   Constitutional:       General: She is not in acute distress.     Appearance: Normal appearance. She is well-developed.   HENT:      Head: Normocephalic and atraumatic.      Right Ear: External ear normal.      Left Ear: External ear normal.      Nose: Nose normal.      Mouth/Throat:      Mouth: Mucous membranes are moist.   Eyes:      General: No scleral icterus.        Right " eye: No discharge.         Left eye: No discharge.      Conjunctiva/sclera: Conjunctivae normal.   Cardiovascular:      Rate and Rhythm: Normal rate and regular rhythm.      Heart sounds: Normal heart sounds. No murmur heard.     No friction rub. No gallop.   Pulmonary:      Effort: Pulmonary effort is normal. No respiratory distress.      Breath sounds: Normal breath sounds. No wheezing, rhonchi or rales.   Musculoskeletal:         General: No deformity.      Right lower leg: No edema.      Left lower leg: No edema.   Skin:     General: Skin is warm and dry.   Neurological:      General: No focal deficit present.      Mental Status: She is alert and oriented to person, place, and time.   Psychiatric:         Mood and Affect: Mood normal.         Behavior: Behavior normal.         Thought Content: Thought content normal.             Hilda Box MD  Internal Medicine and Pediatrics

## 2024-11-20 ENCOUNTER — OFFICE VISIT (OUTPATIENT)
Dept: PRIMARY CARE CLINIC | Facility: CLINIC | Age: 34
End: 2024-11-20
Payer: COMMERCIAL

## 2024-11-20 ENCOUNTER — HOSPITAL ENCOUNTER (INPATIENT)
Facility: HOSPITAL | Age: 34
LOS: 2 days | Discharge: HOME | DRG: 871 | End: 2024-11-22
Attending: EMERGENCY MEDICINE | Admitting: FAMILY MEDICINE
Payer: COMMERCIAL

## 2024-11-20 VITALS
TEMPERATURE: 99 F | DIASTOLIC BLOOD PRESSURE: 84 MMHG | HEART RATE: 110 BPM | BODY MASS INDEX: 26.06 KG/M2 | SYSTOLIC BLOOD PRESSURE: 136 MMHG | OXYGEN SATURATION: 93 % | WEIGHT: 171.94 LBS | HEIGHT: 68 IN

## 2024-11-20 DIAGNOSIS — J18.9 SEPSIS DUE TO PNEUMONIA: ICD-10-CM

## 2024-11-20 DIAGNOSIS — J18.9 PNEUMONIA: ICD-10-CM

## 2024-11-20 DIAGNOSIS — J18.9 COMMUNITY ACQUIRED PNEUMONIA OF RIGHT LUNG, UNSPECIFIED PART OF LUNG: Primary | ICD-10-CM

## 2024-11-20 DIAGNOSIS — A41.9 SEPSIS, DUE TO UNSPECIFIED ORGANISM, UNSPECIFIED WHETHER ACUTE ORGAN DYSFUNCTION PRESENT: Primary | ICD-10-CM

## 2024-11-20 DIAGNOSIS — R07.9 CHEST PAIN: ICD-10-CM

## 2024-11-20 DIAGNOSIS — A41.9 SEPSIS DUE TO PNEUMONIA: ICD-10-CM

## 2024-11-20 LAB
ALBUMIN SERPL BCP-MCNC: 3.4 G/DL (ref 3.5–5.2)
ALLENS TEST: NORMAL
ALP SERPL-CCNC: 74 U/L (ref 40–150)
ALT SERPL W/O P-5'-P-CCNC: 28 U/L (ref 10–44)
ANION GAP SERPL CALC-SCNC: 11 MMOL/L (ref 8–16)
AST SERPL-CCNC: 36 U/L (ref 10–40)
B-HCG UR QL: NEGATIVE
BACTERIA #/AREA URNS AUTO: NORMAL /HPF
BASOPHILS # BLD AUTO: 0.01 K/UL (ref 0–0.2)
BASOPHILS NFR BLD: 0.2 % (ref 0–1.9)
BILIRUB SERPL-MCNC: 0.2 MG/DL (ref 0.1–1)
BILIRUB UR QL STRIP: NEGATIVE
BNP SERPL-MCNC: <10 PG/ML (ref 0–99)
BUN SERPL-MCNC: 5 MG/DL (ref 6–20)
CALCIUM SERPL-MCNC: 9.1 MG/DL (ref 8.7–10.5)
CHLORIDE SERPL-SCNC: 105 MMOL/L (ref 95–110)
CLARITY UR REFRACT.AUTO: CLEAR
CO2 SERPL-SCNC: 23 MMOL/L (ref 23–29)
COLOR UR AUTO: YELLOW
CREAT SERPL-MCNC: 0.8 MG/DL (ref 0.5–1.4)
CTP QC/QA: YES
DIFFERENTIAL METHOD BLD: ABNORMAL
EOSINOPHIL # BLD AUTO: 0.2 K/UL (ref 0–0.5)
EOSINOPHIL NFR BLD: 4.3 % (ref 0–8)
ERYTHROCYTE [DISTWIDTH] IN BLOOD BY AUTOMATED COUNT: 12 % (ref 11.5–14.5)
EST. GFR  (NO RACE VARIABLE): >60 ML/MIN/1.73 M^2
GLUCOSE SERPL-MCNC: 92 MG/DL (ref 70–110)
GLUCOSE UR QL STRIP: NEGATIVE
HCT VFR BLD AUTO: 40.6 % (ref 37–48.5)
HCV AB SERPL QL IA: NORMAL
HGB BLD-MCNC: 13.6 G/DL (ref 12–16)
HGB UR QL STRIP: ABNORMAL
HIV 1+2 AB+HIV1 P24 AG SERPL QL IA: NORMAL
HYALINE CASTS UR QL AUTO: 0 /LPF
IMM GRANULOCYTES # BLD AUTO: 0.02 K/UL (ref 0–0.04)
IMM GRANULOCYTES NFR BLD AUTO: 0.4 % (ref 0–0.5)
INFLUENZA A, MOLECULAR: NEGATIVE
INFLUENZA B, MOLECULAR: NEGATIVE
KETONES UR QL STRIP: NEGATIVE
LDH SERPL L TO P-CCNC: 0.9 MMOL/L (ref 0.5–2.2)
LEUKOCYTE ESTERASE UR QL STRIP: NEGATIVE
LYMPHOCYTES # BLD AUTO: 0.4 K/UL (ref 1–4.8)
LYMPHOCYTES NFR BLD: 7.9 % (ref 18–48)
MAGNESIUM SERPL-MCNC: 2.1 MG/DL (ref 1.6–2.6)
MCH RBC QN AUTO: 28.5 PG (ref 27–31)
MCHC RBC AUTO-ENTMCNC: 33.5 G/DL (ref 32–36)
MCV RBC AUTO: 85 FL (ref 82–98)
MICROSCOPIC COMMENT: NORMAL
MONOCYTES # BLD AUTO: 0.2 K/UL (ref 0.3–1)
MONOCYTES NFR BLD: 4.3 % (ref 4–15)
NEUTROPHILS # BLD AUTO: 4.6 K/UL (ref 1.8–7.7)
NEUTROPHILS NFR BLD: 82.9 % (ref 38–73)
NITRITE UR QL STRIP: NEGATIVE
NRBC BLD-RTO: 0 /100 WBC
OHS QRS DURATION: 64 MS
OHS QTC CALCULATION: 436 MS
PH UR STRIP: 6 [PH] (ref 5–8)
PLATELET # BLD AUTO: 308 K/UL (ref 150–450)
PMV BLD AUTO: 9.1 FL (ref 9.2–12.9)
POTASSIUM SERPL-SCNC: 3.6 MMOL/L (ref 3.5–5.1)
PROCALCITONIN SERPL IA-MCNC: 0.1 NG/ML
PROT SERPL-MCNC: 9 G/DL (ref 6–8.4)
PROT UR QL STRIP: ABNORMAL
RBC # BLD AUTO: 4.77 M/UL (ref 4–5.4)
RBC #/AREA URNS AUTO: 1 /HPF (ref 0–4)
SAMPLE: NORMAL
SARS-COV-2 RDRP RESP QL NAA+PROBE: NEGATIVE
SITE: NORMAL
SODIUM SERPL-SCNC: 139 MMOL/L (ref 136–145)
SP GR UR STRIP: 1.01 (ref 1–1.03)
SPECIMEN SOURCE: NORMAL
URN SPEC COLLECT METH UR: ABNORMAL
WBC # BLD AUTO: 5.57 K/UL (ref 3.9–12.7)
WBC #/AREA URNS AUTO: 2 /HPF (ref 0–5)

## 2024-11-20 PROCEDURE — 87070 CULTURE OTHR SPECIMN AEROBIC: CPT | Performed by: STUDENT IN AN ORGANIZED HEALTH CARE EDUCATION/TRAINING PROGRAM

## 2024-11-20 PROCEDURE — 3044F HG A1C LEVEL LT 7.0%: CPT | Mod: CPTII,S$GLB,, | Performed by: STUDENT IN AN ORGANIZED HEALTH CARE EDUCATION/TRAINING PROGRAM

## 2024-11-20 PROCEDURE — 81025 URINE PREGNANCY TEST: CPT | Performed by: PHYSICIAN ASSISTANT

## 2024-11-20 PROCEDURE — 87389 HIV-1 AG W/HIV-1&-2 AB AG IA: CPT | Performed by: PHYSICIAN ASSISTANT

## 2024-11-20 PROCEDURE — 93005 ELECTROCARDIOGRAM TRACING: CPT

## 2024-11-20 PROCEDURE — 3008F BODY MASS INDEX DOCD: CPT | Mod: CPTII,S$GLB,, | Performed by: STUDENT IN AN ORGANIZED HEALTH CARE EDUCATION/TRAINING PROGRAM

## 2024-11-20 PROCEDURE — 87641 MR-STAPH DNA AMP PROBE: CPT | Performed by: STUDENT IN AN ORGANIZED HEALTH CARE EDUCATION/TRAINING PROGRAM

## 2024-11-20 PROCEDURE — 83880 ASSAY OF NATRIURETIC PEPTIDE: CPT | Performed by: STUDENT IN AN ORGANIZED HEALTH CARE EDUCATION/TRAINING PROGRAM

## 2024-11-20 PROCEDURE — 1159F MED LIST DOCD IN RCRD: CPT | Mod: CPTII,S$GLB,, | Performed by: STUDENT IN AN ORGANIZED HEALTH CARE EDUCATION/TRAINING PROGRAM

## 2024-11-20 PROCEDURE — 84145 PROCALCITONIN (PCT): CPT | Performed by: STUDENT IN AN ORGANIZED HEALTH CARE EDUCATION/TRAINING PROGRAM

## 2024-11-20 PROCEDURE — 63600175 PHARM REV CODE 636 W HCPCS: Performed by: PHYSICIAN ASSISTANT

## 2024-11-20 PROCEDURE — 63600175 PHARM REV CODE 636 W HCPCS: Performed by: STUDENT IN AN ORGANIZED HEALTH CARE EDUCATION/TRAINING PROGRAM

## 2024-11-20 PROCEDURE — 96367 TX/PROPH/DG ADDL SEQ IV INF: CPT

## 2024-11-20 PROCEDURE — 82803 BLOOD GASES ANY COMBINATION: CPT

## 2024-11-20 PROCEDURE — 99214 OFFICE O/P EST MOD 30 MIN: CPT | Mod: S$GLB,,, | Performed by: STUDENT IN AN ORGANIZED HEALTH CARE EDUCATION/TRAINING PROGRAM

## 2024-11-20 PROCEDURE — 3075F SYST BP GE 130 - 139MM HG: CPT | Mod: CPTII,S$GLB,, | Performed by: STUDENT IN AN ORGANIZED HEALTH CARE EDUCATION/TRAINING PROGRAM

## 2024-11-20 PROCEDURE — 96365 THER/PROPH/DIAG IV INF INIT: CPT

## 2024-11-20 PROCEDURE — 99900035 HC TECH TIME PER 15 MIN (STAT)

## 2024-11-20 PROCEDURE — 93010 ELECTROCARDIOGRAM REPORT: CPT | Mod: ,,, | Performed by: STUDENT IN AN ORGANIZED HEALTH CARE EDUCATION/TRAINING PROGRAM

## 2024-11-20 PROCEDURE — 96375 TX/PRO/DX INJ NEW DRUG ADDON: CPT

## 2024-11-20 PROCEDURE — 80053 COMPREHEN METABOLIC PANEL: CPT | Performed by: PHYSICIAN ASSISTANT

## 2024-11-20 PROCEDURE — 86803 HEPATITIS C AB TEST: CPT | Performed by: PHYSICIAN ASSISTANT

## 2024-11-20 PROCEDURE — 83605 ASSAY OF LACTIC ACID: CPT

## 2024-11-20 PROCEDURE — 87154 CUL TYP ID BLD PTHGN 6+ TRGT: CPT | Performed by: PHYSICIAN ASSISTANT

## 2024-11-20 PROCEDURE — 81001 URINALYSIS AUTO W/SCOPE: CPT | Performed by: PHYSICIAN ASSISTANT

## 2024-11-20 PROCEDURE — 25000003 PHARM REV CODE 250: Performed by: PHYSICIAN ASSISTANT

## 2024-11-20 PROCEDURE — 85025 COMPLETE CBC W/AUTO DIFF WBC: CPT | Performed by: PHYSICIAN ASSISTANT

## 2024-11-20 PROCEDURE — 87635 SARS-COV-2 COVID-19 AMP PRB: CPT | Performed by: PHYSICIAN ASSISTANT

## 2024-11-20 PROCEDURE — 1160F RVW MEDS BY RX/DR IN RCRD: CPT | Mod: CPTII,S$GLB,, | Performed by: STUDENT IN AN ORGANIZED HEALTH CARE EDUCATION/TRAINING PROGRAM

## 2024-11-20 PROCEDURE — 83735 ASSAY OF MAGNESIUM: CPT | Performed by: PHYSICIAN ASSISTANT

## 2024-11-20 PROCEDURE — 87502 INFLUENZA DNA AMP PROBE: CPT | Performed by: PHYSICIAN ASSISTANT

## 2024-11-20 PROCEDURE — 87205 SMEAR GRAM STAIN: CPT | Performed by: STUDENT IN AN ORGANIZED HEALTH CARE EDUCATION/TRAINING PROGRAM

## 2024-11-20 PROCEDURE — 25500020 PHARM REV CODE 255: Performed by: EMERGENCY MEDICINE

## 2024-11-20 PROCEDURE — 25000003 PHARM REV CODE 250: Performed by: STUDENT IN AN ORGANIZED HEALTH CARE EDUCATION/TRAINING PROGRAM

## 2024-11-20 PROCEDURE — 99285 EMERGENCY DEPT VISIT HI MDM: CPT | Mod: 25

## 2024-11-20 PROCEDURE — 3079F DIAST BP 80-89 MM HG: CPT | Mod: CPTII,S$GLB,, | Performed by: STUDENT IN AN ORGANIZED HEALTH CARE EDUCATION/TRAINING PROGRAM

## 2024-11-20 PROCEDURE — 21400001 HC TELEMETRY ROOM

## 2024-11-20 PROCEDURE — 99999 PR PBB SHADOW E&M-EST. PATIENT-LVL IV: CPT | Mod: PBBFAC,,, | Performed by: STUDENT IN AN ORGANIZED HEALTH CARE EDUCATION/TRAINING PROGRAM

## 2024-11-20 PROCEDURE — 87040 BLOOD CULTURE FOR BACTERIA: CPT | Performed by: PHYSICIAN ASSISTANT

## 2024-11-20 RX ORDER — ONDANSETRON 8 MG/1
8 TABLET, ORALLY DISINTEGRATING ORAL EVERY 8 HOURS PRN
Status: DISCONTINUED | OUTPATIENT
Start: 2024-11-20 | End: 2024-11-22 | Stop reason: HOSPADM

## 2024-11-20 RX ORDER — IBUPROFEN 200 MG
16 TABLET ORAL
Status: DISCONTINUED | OUTPATIENT
Start: 2024-11-20 | End: 2024-11-22 | Stop reason: HOSPADM

## 2024-11-20 RX ORDER — BENZONATATE 100 MG/1
100 CAPSULE ORAL 3 TIMES DAILY PRN
Status: DISCONTINUED | OUTPATIENT
Start: 2024-11-20 | End: 2024-11-22 | Stop reason: HOSPADM

## 2024-11-20 RX ORDER — IBUPROFEN 200 MG
24 TABLET ORAL
Status: DISCONTINUED | OUTPATIENT
Start: 2024-11-20 | End: 2024-11-22 | Stop reason: HOSPADM

## 2024-11-20 RX ORDER — SIMETHICONE 80 MG
1 TABLET,CHEWABLE ORAL 4 TIMES DAILY PRN
Status: DISCONTINUED | OUTPATIENT
Start: 2024-11-20 | End: 2024-11-22 | Stop reason: HOSPADM

## 2024-11-20 RX ORDER — SODIUM CHLORIDE 0.9 % (FLUSH) 0.9 %
1-10 SYRINGE (ML) INJECTION EVERY 12 HOURS PRN
Status: DISCONTINUED | OUTPATIENT
Start: 2024-11-20 | End: 2024-11-22 | Stop reason: HOSPADM

## 2024-11-20 RX ORDER — TALC
6 POWDER (GRAM) TOPICAL NIGHTLY PRN
Status: DISCONTINUED | OUTPATIENT
Start: 2024-11-20 | End: 2024-11-22 | Stop reason: HOSPADM

## 2024-11-20 RX ORDER — ALBUTEROL SULFATE 90 UG/1
2 INHALANT RESPIRATORY (INHALATION) EVERY 6 HOURS PRN
Status: DISCONTINUED | OUTPATIENT
Start: 2024-11-20 | End: 2024-11-22 | Stop reason: HOSPADM

## 2024-11-20 RX ORDER — IBUPROFEN 400 MG/1
400 TABLET ORAL ONCE AS NEEDED
Status: COMPLETED | OUTPATIENT
Start: 2024-11-20 | End: 2024-11-20

## 2024-11-20 RX ORDER — ALUMINUM HYDROXIDE, MAGNESIUM HYDROXIDE, AND SIMETHICONE 1200; 120; 1200 MG/30ML; MG/30ML; MG/30ML
30 SUSPENSION ORAL 4 TIMES DAILY PRN
Status: DISCONTINUED | OUTPATIENT
Start: 2024-11-20 | End: 2024-11-22 | Stop reason: HOSPADM

## 2024-11-20 RX ORDER — ACETAMINOPHEN 325 MG/1
650 TABLET ORAL
Status: COMPLETED | OUTPATIENT
Start: 2024-11-20 | End: 2024-11-20

## 2024-11-20 RX ORDER — NALOXONE HCL 0.4 MG/ML
0.02 VIAL (ML) INJECTION
Status: DISCONTINUED | OUTPATIENT
Start: 2024-11-20 | End: 2024-11-22 | Stop reason: HOSPADM

## 2024-11-20 RX ORDER — GLUCAGON 1 MG
1 KIT INJECTION
Status: DISCONTINUED | OUTPATIENT
Start: 2024-11-20 | End: 2024-11-22 | Stop reason: HOSPADM

## 2024-11-20 RX ORDER — KETOROLAC TROMETHAMINE 30 MG/ML
15 INJECTION, SOLUTION INTRAMUSCULAR; INTRAVENOUS
Status: COMPLETED | OUTPATIENT
Start: 2024-11-20 | End: 2024-11-20

## 2024-11-20 RX ORDER — POLYETHYLENE GLYCOL 3350 17 G/17G
17 POWDER, FOR SOLUTION ORAL DAILY PRN
Status: DISCONTINUED | OUTPATIENT
Start: 2024-11-20 | End: 2024-11-22 | Stop reason: HOSPADM

## 2024-11-20 RX ORDER — ACETAMINOPHEN 325 MG/1
650 TABLET ORAL EVERY 4 HOURS PRN
Status: DISCONTINUED | OUTPATIENT
Start: 2024-11-20 | End: 2024-11-22 | Stop reason: HOSPADM

## 2024-11-20 RX ADMIN — VANCOMYCIN HYDROCHLORIDE 1500 MG: 1.5 INJECTION, POWDER, LYOPHILIZED, FOR SOLUTION INTRAVENOUS at 03:11

## 2024-11-20 RX ADMIN — KETOROLAC TROMETHAMINE 15 MG: 30 INJECTION, SOLUTION INTRAMUSCULAR; INTRAVENOUS at 04:11

## 2024-11-20 RX ADMIN — IBUPROFEN 400 MG: 400 TABLET ORAL at 11:11

## 2024-11-20 RX ADMIN — AZITHROMYCIN MONOHYDRATE 500 MG: 500 INJECTION, POWDER, LYOPHILIZED, FOR SOLUTION INTRAVENOUS at 07:11

## 2024-11-20 RX ADMIN — IOHEXOL 75 ML: 350 INJECTION, SOLUTION INTRAVENOUS at 03:11

## 2024-11-20 RX ADMIN — PIPERACILLIN AND TAZOBACTAM 4.5 G: 4; .5 INJECTION, POWDER, FOR SOLUTION INTRAVENOUS; PARENTERAL at 02:11

## 2024-11-20 RX ADMIN — ACETAMINOPHEN 650 MG: 325 TABLET ORAL at 02:11

## 2024-11-20 RX ADMIN — PIPERACILLIN SODIUM AND TAZOBACTAM SODIUM 4.5 G: 4; .5 INJECTION, POWDER, FOR SOLUTION INTRAVENOUS at 10:11

## 2024-11-20 RX ADMIN — BENZONATATE 100 MG: 100 CAPSULE ORAL at 11:11

## 2024-11-20 RX ADMIN — ACETAMINOPHEN 650 MG: 325 TABLET ORAL at 08:11

## 2024-11-20 RX ADMIN — SODIUM CHLORIDE, POTASSIUM CHLORIDE, SODIUM LACTATE AND CALCIUM CHLORIDE 2367 ML: 600; 310; 30; 20 INJECTION, SOLUTION INTRAVENOUS at 02:11

## 2024-11-20 NOTE — FIRST PROVIDER EVALUATION
Emergency Department TeleTriage Encounter Note      CHIEF COMPLAINT    Chief Complaint   Patient presents with    Pneumonia     Dx with pneumonia being treated outpatient by PCP but arrives needing breathing treatments.       VITAL SIGNS   Initial Vitals [11/20/24 1131]   BP Pulse Resp Temp SpO2   (!) 144/90 103 20 (!) 100.8 °F (38.2 °C) 98 %      MAP       --            ALLERGIES    Review of patient's allergies indicates:   Allergen Reactions    Nut - unspecified Anaphylaxis    Latex Rash       PROVIDER TRIAGE NOTE  Patient was sent from PCP office for ED evaluation. She was dx with pneumonia Friday and has been taking Augmentin without improvement. She has history of asthma and was hypoxic in the PCP office, but sat is 98% at time of triage. She is not in respiratory distress at this time.       ORDERS  Labs Reviewed   HEPATITIS C ANTIBODY   HIV 1 / 2 ANTIBODY       ED Orders (720h ago, onward)      Start Ordered     Status Ordering Provider    11/20/24 1134 11/20/24 1133  EKG 12-lead  Once         Ordered BESSIE GUILLAUME    11/20/24 1133 11/20/24 1133  Hepatitis C Antibody  STAT         Ordered EMELY POST    11/20/24 1133 11/20/24 1133  HIV 1/2 Ag/Ab (4th Gen)  STAT         Ordered EMELY POST              Virtual Visit Note: The provider triage portion of this emergency department evaluation and documentation was performed via [employer], a HIPAA-compliant telemedicine application, in concert with a tele-presenter in the room. A face to face patient evaluation with one of my colleagues will occur once the patient is placed in an emergency department room.      DISCLAIMER: This note was prepared with [employer] voice recognition transcription software. Garbled syntax, mangled pronouns, and other bizarre constructions may be attributed to that software system.

## 2024-11-20 NOTE — PROGRESS NOTES
"Office visit  Patient: Evelia Ramon   11/20/2024       Assessment/Plan:       1. Community acquired pneumonia of right lung, unspecified part of lung        -failed treatment with Augmentin, per IDSA guidelines, they recommend monotherapy with amoxicillin; did not treat with azithromycin due to IDSA guidelines        -worsening despite treatment and now in mild respiratory distress with increased respiratory rate and accessory muscle use, as well as SpO2 of 93%        -Patient advised to go to the ED for IV antibiotics and consideration of hospital admission; report called to [facility]; patient understands and will drive herself to ED      CHIEF COMPLAINT: worsening fever and cough    HPI: Evelia Ramon is a 34 y.o. female who presents for night sweats and fever. She was recently diagnosed with community acquired pneumonia.  She states she's been getting night sweats. She also developed diarrhea and vomiting.  She feels her heart racing.  She's coughing up a lot of mucous.  She also has migraines.  Her fever is up to 102 F.  She has significant fatigue.  She has been taking mucinex and ibuprofen regularly.  She reports that she has difficulty catching her breath.        Current Outpatient Medications   Medication Instructions    albuterol (PROAIR HFA) 90 mcg/actuation inhaler 2 puffs, Inhalation, Every 6 hours PRN, Rescue    amoxicillin-clavulanate 875-125mg (AUGMENTIN) 875-125 mg per tablet 1 tablet, Oral, Every 12 hours    EPINEPHrine (EPIPEN 2-DELVIS) 0.3 mg, Intramuscular, Once as needed    triamcinolone acetonide 0.1% (KENALOG) 0.1 % cream Topical (Top), 2 times daily       Lab Results   Component Value Date    HGBA1C 5.4 04/09/2024     No results found for: "MICALBCREAT"  Lab Results   Component Value Date    LDLCALC 100.2 04/09/2024    CHOL 156 04/09/2024    HDL 48 04/09/2024    TRIG 39 04/09/2024       Lab Results   Component Value Date     04/09/2024    K 3.6 04/09/2024     " "04/09/2024    CO2 26 04/09/2024    GLU 81 04/09/2024    BUN 7 04/09/2024    CREATININE 0.9 04/09/2024    CALCIUM 9.3 04/09/2024    PROT 7.3 04/09/2024    ALBUMIN 3.8 04/09/2024    BILITOT 0.5 04/09/2024    ALKPHOS 62 04/09/2024    AST 21 04/09/2024    ALT 22 04/09/2024    ANIONGAP 7 (L) 04/09/2024    ESTGFRAFRICA >105 08/04/2021    EGFRNONAA >60.0 06/12/2020    WBC 5.35 04/09/2024    HGB 10.9 (L) 04/09/2024    HGB 12.7 09/25/2020    HCT 34.0 (L) 04/09/2024    MCV 90 04/09/2024     04/09/2024    TSH 1.769 04/09/2024       Lab Results   Component Value Date    FERRITIN 47 11/06/2024    IRON 66 11/06/2024    TRANSFERRIN 217 11/06/2024    TIBC 321 11/06/2024    FESATURATED 21 11/06/2024         Past Medical History:   Diagnosis Date    Allergy     Anemia     Asthma     Eczema     PCOS (polycystic ovarian syndrome)      Past Surgical History:   Procedure Laterality Date    CERVICAL CERCLAGE      WISDOM TOOTH EXTRACTION         Social History     Tobacco Use    Smoking status: Never     Passive exposure: Never    Smokeless tobacco: Never   Substance Use Topics    Alcohol use: No    Drug use: No       family history includes Arthritis in her maternal grandmother; Bone cancer in her paternal uncle; Dementia in her maternal grandmother; Diabetes in her paternal grandmother; Hypertension in her maternal grandmother, mother, and paternal grandmother; Osteoporosis in her maternal aunt.    Vitals:    11/20/24 0855   BP: 136/84   Pulse: 110   Temp: 98.7 °F (37.1 °C)   TempSrc: Oral   SpO2: (!) 93%   Weight: 78 kg (171 lb 15.3 oz)   Height: 5' 7.5" (1.715 m)   PainSc:   4     Objective:   Physical Exam  Vitals reviewed.   Constitutional:       Appearance: Normal appearance. She is well-developed. She is ill-appearing.   HENT:      Head: Normocephalic and atraumatic.      Right Ear: External ear normal.      Left Ear: External ear normal.      Nose: Nose normal.      Mouth/Throat:      Mouth: Mucous membranes are moist. "   Eyes:      General: No scleral icterus.        Right eye: No discharge.         Left eye: No discharge.      Conjunctiva/sclera: Conjunctivae normal.   Cardiovascular:      Rate and Rhythm: Normal rate and regular rhythm.      Heart sounds: Normal heart sounds. No murmur heard.     No friction rub. No gallop.   Pulmonary:      Effort: Pulmonary effort is normal. No respiratory distress.      Breath sounds: Rhonchi and rales present. No wheezing.      Comments: RR is 25  Musculoskeletal:         General: No deformity.      Right lower leg: No edema.      Left lower leg: No edema.   Skin:     General: Skin is warm and dry.   Neurological:      General: No focal deficit present.      Mental Status: She is alert and oriented to person, place, and time.   Psychiatric:         Mood and Affect: Mood normal.         Behavior: Behavior normal.         Thought Content: Thought content normal.             Hilda Box MD  Internal Medicine and Pediatrics

## 2024-11-20 NOTE — ED PROVIDER NOTES
Encounter Date: 11/20/2024       History     Chief Complaint   Patient presents with    Pneumonia     Dx with pneumonia being treated outpatient by PCP but arrives needing breathing treatments.     The history is provided by the patient and medical records. No  was used.     Evelia Ramon is a 34 y.o. female with medical history of asthma, PCOS presenting to the ED with the chief complaint of pneumonia.     Referred to the ED from IM clinic today. Patient reports having productive cough, fever, chills, generalized weakness for the past week. CXR 5 days ago showed RUL PNA and given RX for Augmentin. She feels like her symptoms have worsened and has become more SOB despite compliance with the Augmentin. Continuing having fever and chills. Reports a few episodes of emesis over the last few days. Denies tobacco or ETOH use. Denies an aspiration event.       Review of patient's allergies indicates:   Allergen Reactions    Nut - unspecified Anaphylaxis     Past Medical History:   Diagnosis Date    Allergy     Anemia     Asthma     Eczema     PCOS (polycystic ovarian syndrome)      Past Surgical History:   Procedure Laterality Date    CERVICAL CERCLAGE      WISDOM TOOTH EXTRACTION       Family History   Problem Relation Name Age of Onset    Hypertension Mother      Osteoporosis Maternal Aunt      Bone cancer Paternal Uncle      Hypertension Maternal Grandmother      Arthritis Maternal Grandmother      Dementia Maternal Grandmother      Hypertension Paternal Grandmother      Diabetes Paternal Grandmother       Social History     Tobacco Use    Smoking status: Never     Passive exposure: Never    Smokeless tobacco: Never   Substance Use Topics    Alcohol use: No    Drug use: No     Review of Systems   Constitutional:  Positive for fever.   Respiratory:  Positive for cough.        Physical Exam     Initial Vitals [11/20/24 1131]   BP Pulse Resp Temp SpO2   (!) 144/90 103 20 (!) 100.8 °F (38.2 °C) 98 %       MAP       --         Physical Exam    Constitutional: She appears well-developed and well-nourished. She is not diaphoretic. No distress.   HENT:   Head: Normocephalic and atraumatic. Mouth/Throat: Oropharynx is clear and moist. No oropharyngeal exudate.   Eyes: Conjunctivae and EOM are normal. Pupils are equal, round, and reactive to light. No scleral icterus.   Neck: Neck supple.   Normal range of motion.  Cardiovascular:  Regular rhythm.   Tachycardia present.         Pulmonary/Chest: Tachypnea noted. No respiratory distress. She has no wheezes. She has rhonchi. She has no rales.   Abdominal: Abdomen is soft. She exhibits no distension. There is no abdominal tenderness. There is no rebound.   Musculoskeletal:         General: No tenderness or edema. Normal range of motion.      Cervical back: Normal range of motion and neck supple.     Neurological: She is alert and oriented to person, place, and time. She has normal strength. No sensory deficit.   Skin: Skin is warm and dry. No rash noted. No erythema.   Psychiatric: She has a normal mood and affect.       ED Course   Critical Care    Date/Time: 11/20/2024 7:08 PM    Performed by: Zaid Landry PA-C  Authorized by: Stephen Mustafa III, MD  Direct patient critical care time: 15 minutes  Additional history critical care time: 6 minutes  Ordering / reviewing critical care time: 5 minutes  Documentation critical care time: 6 minutes  Consulting other physicians critical care time: 4 minutes  Total critical care time (exclusive of procedural time) : 36 minutes  Critical care was necessary to treat or prevent imminent or life-threatening deterioration of the following conditions: sepsis.        Labs Reviewed   CBC W/ AUTO DIFFERENTIAL - Abnormal       Result Value    WBC 5.57      RBC 4.77      Hemoglobin 13.6      Hematocrit 40.6      MCV 85      MCH 28.5      MCHC 33.5      RDW 12.0      Platelets 308      MPV 9.1 (*)     Immature Granulocytes 0.4      Gran  # (ANC) 4.6      Immature Grans (Abs) 0.02      Lymph # 0.4 (*)     Mono # 0.2 (*)     Eos # 0.2      Baso # 0.01      nRBC 0      Gran % 82.9 (*)     Lymph % 7.9 (*)     Mono % 4.3      Eosinophil % 4.3      Basophil % 0.2      Differential Method Automated     COMPREHENSIVE METABOLIC PANEL - Abnormal    Sodium 139      Potassium 3.6      Chloride 105      CO2 23      Glucose 92      BUN 5 (*)     Creatinine 0.8      Calcium 9.1      Total Protein 9.0 (*)     Albumin 3.4 (*)     Total Bilirubin 0.2      Alkaline Phosphatase 74      AST 36      ALT 28      eGFR >60.0      Anion Gap 11     URINALYSIS, REFLEX TO URINE CULTURE - Abnormal    Specimen UA Urine, Clean Catch      Color, UA Yellow      Appearance, UA Clear      pH, UA 6.0      Specific Gravity, UA 1.015      Protein, UA 1+ (*)     Glucose, UA Negative      Ketones, UA Negative      Bilirubin (UA) Negative      Occult Blood UA Trace (*)     Nitrite, UA Negative      Leukocytes, UA Negative      Narrative:     Specimen Source->Urine   INFLUENZA A & B BY MOLECULAR    Influenza A, Molecular Negative      Influenza B, Molecular Negative      Flu A & B Source Nasal swab     CULTURE, BLOOD   CULTURE, BLOOD   MRSA SCREEN BY PCR   CULTURE, RESPIRATORY   HEPATITIS C ANTIBODY    Hepatitis C Ab Non-reactive      Narrative:     Release to patient->Immediate   HIV 1 / 2 ANTIBODY    HIV 1/2 Ag/Ab Non-reactive      Narrative:     Release to patient->Immediate   SARS-COV-2 RNA AMPLIFICATION, QUAL    SARS-CoV-2 RNA, Amplification, Qual Negative     MAGNESIUM    Magnesium 2.1     URINALYSIS MICROSCOPIC    RBC, UA 1      WBC, UA 2      Bacteria None      Hyaline Casts, UA 0      Microscopic Comment SEE COMMENT      Narrative:     Specimen Source->Urine   PROCALCITONIN   B-TYPE NATRIURETIC PEPTIDE   POCT URINE PREGNANCY    POC Preg Test, Ur Negative       Acceptable Yes     ISTAT LACTATE    POC Lactate 0.90      Sample VENOUS      Site Other      Allens Test N/A        EKG Readings: (Independently Interpreted)   Sinus tachycardia 108 bpm. Normal axis. Normal T waves. NO STEMI     ECG Results              EKG 12-lead (Final result)        Collection Time Result Time QRS Duration OHS QTC Calculation    11/20/24 12:45:55 11/20/24 15:00:54 64 436                     Final result by Interface, Lab In [facility] (11/20/24 15:01:01)                   Narrative:    Test Reason : J18.9,    Vent. Rate : 108 BPM     Atrial Rate : 108 BPM     P-R Int : 142 ms          QRS Dur :  64 ms      QT Int : 326 ms       P-R-T Axes :  51  49  70 degrees    QTcB Int : 436 ms    Sinus tachycardia  Normal ECG  No previous ECGs available  Confirmed by Rajan Paredes ([id number]) on 11/20/2024 3:00:47 PM    Referred By: AAAREFERRAL SELF           Confirmed By: Rajan Paredes                                  Imaging Results              CT Chest With Contrast (Final result)  Result time 11/20/24 18:01:56      Final result by Jimenez Denson MD (11/20/24 18:01:56)                   Impression:      Right-sided multifocal pneumonia with likely reactive mediastinal and hilar lymph nodes.  There has been progression compared to the prior chest x-ray dated 11/15/2024.    No pleural effusion to suggest a parapneumonic effusion.    Electronically signed by resident: Benito Tucker  Date:    11/20/2024  Time:    17:38    Electronically signed by: Jimenez Denson MD  Date:    11/20/2024  Time:    18:01               Narrative:    EXAMINATION:  CT CHEST WITH CONTRAST    CLINICAL HISTORY:  Sepsis;R PNA, eval for parapneumonic effusion;    TECHNIQUE:  Low dose axial images, sagittal and coronal reformations were obtained from the thoracic inlet to the lung bases.  75 cc Omnipaque 350 was administered intravenously.    COMPARISON:  Chest radiograph 11/20/2024 and 11/15/2024.    FINDINGS:  Base of Neck: Prominent supraclavicular lymph nodes measure up to 1.0 cm (series 2, image 7).  Partially visualized thyroid gland  appears normal in size without focal lesion.    Thoracic soft tissues: No axillary lymphadenopathy or focal lesion..    Aorta: Three-vessel aortic arch with normal caliber.  No calcific atherosclerosis of the thoracic aorta.    Heart: Normal sized upper pericardial fluid.  No coronary artery atherosclerosis.    Pulmonary vasculature: Pulmonary arteries distribute normally with normal main pulmonary artery diameter.    Susanne/Mediastinum: Enlarged mediastinal and right hilar lymph nodes, including right lower paratracheal lymph node measuring 1.2 cm (series 2, image 35), subcarinal lymph node measuring 1.0 cm (series 2, image 44), and right hilar lymph node measuring 1.0 cm (series 2, image 37).    Airways: Central airways are patent.    Lungs/Pleura: Right-sided bronchial wall thickening, large right lower lobe consolidative opacity with air bronchograms, and bronchovascular opacities in the right upper, middle, and lower lobes concerning for acute infectious process.  Additional patchy subcentimeter opacities throughout the right lung.  No pleural effusion or pneumothorax.    Esophagus: Normal in course and caliber.    Upper Abdomen: Subcentimeter hypodensity in the inferior right and left hepatic lobes, too small to characterize.  No significant abnormalities.    Bones: No acute fracture or destructive lesions.                                       X-Ray Chest AP Portable (Final result)  Result time 11/20/24 13:46:15      Final result by Lorna Mooney MD (11/20/24 13:46:15)                   Impression:      Interval progression of findings seen on previous exam, most compatible with worsening pneumonia and development of pleural fluid.      Electronically signed by: Lorna Mooney MD  Date:    11/20/2024  Time:    13:46               Narrative:    EXAMINATION:  XR CHEST AP PORTABLE    CLINICAL HISTORY:  Sepsis;    TECHNIQUE:  Single frontal view of the chest was performed.    COMPARISON:  November 15,  2024    FINDINGS:  Cardiac size is not enlarged.  There is increasing opacity in the right lower lung when compared to previous imaging, to a lesser extent in the mid right lung.  There is poor definition of the right costophrenic angle, likely representing small pleural effusion.  The left lung is clear.  Osseous structures are intact.                                       Medications   azithromycin (ZITHROMAX) 500 mg in D5W 250 mL  IVPB (admixture device) (has no administration in time range)   albuterol inhaler 2 puff (has no administration in time range)   vancomycin - pharmacy to dose (has no administration in time range)   piperacillin-tazobactam (ZOSYN) 4.5 g in D5W 100 mL IVPB (MB+) (has no administration in time range)   sodium chloride 0.9% flush 1-10 mL (has no administration in time range)   melatonin tablet 6 mg (has no administration in time range)   ondansetron disintegrating tablet 8 mg (has no administration in time range)   polyethylene glycol packet 17 g (has no administration in time range)   simethicone chewable tablet 80 mg (has no administration in time range)   aluminum-magnesium hydroxide-simethicone 200-200-20 mg/5 mL suspension 30 mL (has no administration in time range)   acetaminophen tablet 650 mg (has no administration in time range)   naloxone 0.4 mg/mL injection 0.02 mg (has no administration in time range)   glucose chewable tablet 16 g (has no administration in time range)   glucose chewable tablet 24 g (has no administration in time range)   glucagon (human recombinant) injection 1 mg (has no administration in time range)   acetaminophen tablet 650 mg (650 mg Oral Given 11/20/24 1404)   lactated ringers bolus 2,367 mL (0 mLs Intravenous Stopped 11/20/24 1507)   vancomycin 1,500 mg in D5W 250 mL IVPB (admixture device) (0 mg Intravenous Stopped 11/20/24 1707)   piperacillin-tazobactam (ZOSYN) 4.5 g in D5W 100 mL IVPB (MB+) (0 g Intravenous Stopped 11/20/24 1442)   iohexoL  (OMNIPAQUE 350) injection 75 mL (75 mLs Intravenous Given 11/20/24 1513)   ketorolac injection 15 mg (15 mg Intravenous Given 11/20/24 1646)     Medical Decision Making  34 y.o. female with medical history of asthma, PCOS presenting to the ED from IM clinic for further evaluation of worsening PNA symptoms despite 5 days of Augmentin.     DDx includes but not limited to sepsis, pneumonia, empyema, viral syndrome, acute respiratory failure.     Amount and/or Complexity of Data Reviewed  External Data Reviewed: labs and notes.  Labs: ordered. Decision-making details documented in ED Course.  Radiology: ordered and independent interpretation performed.  ECG/medicine tests: ordered and independent interpretation performed.               ED Course as of 11/20/24 1909 Wed Nov 20, 2024   1251 EKG 12-lead  No STEMI [AC]   1906 CXR showing  increasing opacity in the right lower lung with questionable pleural effusion [BA]   1906 CT showing Right-sided multifocal pneumonia with likely reactive mediastinal and hilar lymph nodes. No pleural effusion to suggest a parapneumonic effusion.   [BA]   1906 WBC: 5.57 [BA]   1906 Hemoglobin: 13.6 [BA]   1906 Sodium: 139 [BA]   1906 Creatinine: 0.8 [BA]   1906 POC Lactate: 0.90  Normal lactate [BA]   1906 Viral screening negative [BA]   1907 Patient feels improvement in her SOB on reassessment after antipyretics, ABx, and IVF.     I discussed with HM, admitted for ongoing management of sepsis and PNA. Patient expresses understanding and agreeable to the plan. I have discussed the care of this patient with my supervising physician.  [BA]      ED Course User Index  [AC] Jayce Boss,   [BA] Zaid Landry, SABRINA          This patient does have evidence of infective focus  My overall impression is sepsis.  Source: Respiratory  Antibiotics given-   Antibiotics (72h ago, onward)      Start     Stop Route Frequency Ordered    11/20/24 2200  piperacillin-tazobactam (ZOSYN) 4.5 g in D5W  100 mL IVPB (MB+)         -- IV Every 8 hours (non-standard times) 11/20/24 1852    11/20/24 1945  azithromycin (ZITHROMAX) 500 mg in D5W 250 mL  IVPB (admixture device)         -- IV Every 24 hours (non-standard times) 11/20/24 1838          Latest lactate reviewed-  Recent Labs   Lab 11/20/24  1305   POCLAC 0.90     Organ dysfunction indicated by Acute respiratory failure    Fluid challenge Ideal Body Weight- The patient's ideal body weight is Ideal body weight: 61.6 kg (135 lb 12.9 oz) which will be used to calculate fluid bolus of 30 ml/kg for treatment of septic shock.      Post- resuscitation assessment Yes Perfusion exam was performed within 6 hours of septic shock presentation after bolus shows Adequate tissue perfusion assessed by non-invasive monitoring       Will Not start Pressors- Levophed for MAP of 65  Source control achieved by: ABx                   Clinical Impression:  Final diagnoses:  [J18.9] Pneumonia  [A41.9] Sepsis, due to unspecified organism, unspecified whether acute organ dysfunction present (Primary)          ED Disposition Condition    Admit Stable                Zaid Landry, PA-C  11/20/24 5535

## 2024-11-20 NOTE — Clinical Note
Diagnosis: Pneumonia [124295]   Reason for IP Medical Treatment  (Clinical interventions that can only be accomplished in the IP setting? ) :: kathy   Plans for Post-Acute care--if anticipated (pick the single best option):: A. No post acute care anticipated at this time

## 2024-11-20 NOTE — LETTER
"November 22, 2024             [facility]  [address]  [facility] [address]  Phone: [phone]  November 22, 2024     Patient: Evelia Ramon (Quaneisha)     YOB: 1990        To Whom It May Concern:    Evelia Ramon was admitted to the hospital on 11/20/2024 12:58 PM and discharged on 11/22/2024  She may return to work on 12/02/2024 .  If you have any questions or concerns, or if I can be of any further assistance, please do not hesitate to contact me.    Sincerely,        Stanley Mckinnon III, MD  Department of Hospital Medicine     "

## 2024-11-20 NOTE — PLAN OF CARE
[facility] - Emergency Dept  Discharge Assessment    Primary Care Provider: Hilda Box MD     Discharge Assessment (most recent)       BRIEF DISCHARGE ASSESSMENT - 11/20/24 1026          Discharge Planning    Assessment Type Discharge Planning Assessment (P)      Resource/Environmental Concerns none (P)      Support Systems Spouse/significant other (P)      Equipment Currently Used at Home none (P)      Current Living Arrangements home (P)      Patient/Family Anticipates Transition to home (P)      Patient/Family Anticipated Services at Transition none (P)      DME Needed Upon Discharge  none (P)      Discharge Plan A Home with family (P)      Discharge Plan B Home with family (P)         Physical Activity    On average, how many days per week do you engage in moderate to strenuous exercise (like a brisk walk)? Patient declined (P)      On average, how many minutes do you engage in exercise at this level? Patient declined (P)         Financial Resource Strain    How hard is it for you to pay for the very basics like food, housing, medical care, and heating? Patient declined (P)         Housing Stability    In the last 12 months, was there a time when you were not able to pay the mortgage or rent on time? Patient declined (P)      At any time in the past 12 months, were you homeless or living in a shelter (including now)? Patient declined (P)         Transportation Needs    Has the lack of transportation kept you from medical appointments, meetings, work or from getting things needed for daily living? Patient declined (P)         Food Insecurity    Within the past 12 months, you worried that your food would run out before you got the money to buy more. Patient declined (P)      Within the past 12 months, the food you bought just didn't last and you didn't have money to get more. Patient declined (P)         Stress    Do you feel stress - tense, restless, nervous, or anxious, or unable to sleep at night  because your mind is troubled all the time - these days? Patient declined (P)         Social Isolation    How often do you feel lonely or isolated from those around you?  Patient declined (P)         Alcohol Use    Q1: How often do you have a drink containing alcohol? Patient declined (P)      Q2: How many drinks containing alcohol do you have on a typical day when you are drinking? Patient declined (P)      Q3: How often do you have six or more drinks on one occasion? Patient declined (P)         [employer]    In the past 12 months has the electric, gas, oil, or water company threatened to shut off services in your home? Patient declined (P)                       Sw met with pt at bedside. Pt asked SW about transportation resources for her spouse. Informed  SW that she can reach out to [employer] or a local Religion. PT asked SW to do breathing exercises with pt. Sw assisted pt with exercise Pt has no additional case management needs at this time.      Felipe Kelly LMSW  Case Management  Emergency Department  [phone]

## 2024-11-21 LAB — MRSA SCREEN BY PCR: NOT DETECTED

## 2024-11-21 PROCEDURE — 63600175 PHARM REV CODE 636 W HCPCS: Performed by: HOSPITALIST

## 2024-11-21 PROCEDURE — 99900035 HC TECH TIME PER 15 MIN (STAT)

## 2024-11-21 PROCEDURE — 25000003 PHARM REV CODE 250

## 2024-11-21 PROCEDURE — 94799 UNLISTED PULMONARY SVC/PX: CPT

## 2024-11-21 PROCEDURE — 25000242 PHARM REV CODE 250 ALT 637 W/ HCPCS

## 2024-11-21 PROCEDURE — 63600175 PHARM REV CODE 636 W HCPCS: Performed by: STUDENT IN AN ORGANIZED HEALTH CARE EDUCATION/TRAINING PROGRAM

## 2024-11-21 PROCEDURE — 25000003 PHARM REV CODE 250: Performed by: STUDENT IN AN ORGANIZED HEALTH CARE EDUCATION/TRAINING PROGRAM

## 2024-11-21 PROCEDURE — 94640 AIRWAY INHALATION TREATMENT: CPT

## 2024-11-21 PROCEDURE — 94761 N-INVAS EAR/PLS OXIMETRY MLT: CPT

## 2024-11-21 PROCEDURE — 21400001 HC TELEMETRY ROOM

## 2024-11-21 RX ORDER — KETOROLAC TROMETHAMINE 15 MG/ML
15 INJECTION, SOLUTION INTRAMUSCULAR; INTRAVENOUS EVERY 6 HOURS PRN
Status: DISCONTINUED | OUTPATIENT
Start: 2024-11-21 | End: 2024-11-22 | Stop reason: HOSPADM

## 2024-11-21 RX ORDER — IBUPROFEN 400 MG/1
800 TABLET ORAL ONCE
Status: COMPLETED | OUTPATIENT
Start: 2024-11-21 | End: 2024-11-21

## 2024-11-21 RX ORDER — IPRATROPIUM BROMIDE AND ALBUTEROL SULFATE 2.5; .5 MG/3ML; MG/3ML
3 SOLUTION RESPIRATORY (INHALATION) EVERY 6 HOURS
Status: DISCONTINUED | OUTPATIENT
Start: 2024-11-21 | End: 2024-11-22 | Stop reason: HOSPADM

## 2024-11-21 RX ADMIN — IPRATROPIUM BROMIDE AND ALBUTEROL SULFATE 3 ML: .5; 3 SOLUTION RESPIRATORY (INHALATION) at 07:11

## 2024-11-21 RX ADMIN — BENZONATATE 100 MG: 100 CAPSULE ORAL at 06:11

## 2024-11-21 RX ADMIN — BENZONATATE 100 MG: 100 CAPSULE ORAL at 07:11

## 2024-11-21 RX ADMIN — ACETAMINOPHEN 650 MG: 325 TABLET ORAL at 09:11

## 2024-11-21 RX ADMIN — VANCOMYCIN HYDROCHLORIDE 1250 MG: 1.25 INJECTION, POWDER, LYOPHILIZED, FOR SOLUTION INTRAVENOUS at 07:11

## 2024-11-21 RX ADMIN — PIPERACILLIN SODIUM AND TAZOBACTAM SODIUM 4.5 G: 4; .5 INJECTION, POWDER, FOR SOLUTION INTRAVENOUS at 06:11

## 2024-11-21 RX ADMIN — PIPERACILLIN SODIUM AND TAZOBACTAM SODIUM 4.5 G: 4; .5 INJECTION, POWDER, FOR SOLUTION INTRAVENOUS at 03:11

## 2024-11-21 RX ADMIN — PIPERACILLIN SODIUM AND TAZOBACTAM SODIUM 4.5 G: 4; .5 INJECTION, POWDER, FOR SOLUTION INTRAVENOUS at 10:11

## 2024-11-21 RX ADMIN — AZITHROMYCIN MONOHYDRATE 500 MG: 500 INJECTION, POWDER, LYOPHILIZED, FOR SOLUTION INTRAVENOUS at 08:11

## 2024-11-21 RX ADMIN — IPRATROPIUM BROMIDE AND ALBUTEROL SULFATE 3 ML: .5; 3 SOLUTION RESPIRATORY (INHALATION) at 02:11

## 2024-11-21 RX ADMIN — KETOROLAC TROMETHAMINE 15 MG: 15 INJECTION, SOLUTION INTRAMUSCULAR; INTRAVENOUS at 10:11

## 2024-11-21 RX ADMIN — ONDANSETRON 8 MG: 8 TABLET, ORALLY DISINTEGRATING ORAL at 06:11

## 2024-11-21 RX ADMIN — IBUPROFEN 800 MG: 400 TABLET ORAL at 02:11

## 2024-11-21 NOTE — ED NOTES
Telemetry Verification   Patient placed on Telemetry Box  Verified with War Room  Box # [id number]   Monitor Tech tele   Rate 103   Rhythm Sinus tach

## 2024-11-21 NOTE — PLAN OF CARE
[facility] - Med Surg  Initial Discharge Assessment       Primary Care Provider: Hilda Box MD    Admission Diagnosis: Pneumonia [J18.9]  Chest pain [R07.9]  Sepsis, due to unspecified organism, unspecified whether acute organ dysfunction present [A41.9]    Admission Date: 11/20/2024  Expected Discharge Date:     Transition of Care Barriers: None    Payor: [employer] / Plan: [facility] SELECT PLUS / Product Type: Commercial /     Extended Emergency Contact Information  Primary Emergency Contact: Filiberto Ramon  Address: [address] [address] [facility]  Home Phone: [phone]  Relation: Spouse  Secondary Emergency Contact: Kelly Kinney  Mobile Phone: [phone]  Relation: Mother  Preferred language: English   needed? No    Discharge Plan A: Home with family  Discharge Plan B: Home      [id number] - [place], [place] - [address] [facility]  [address]  [facility] [address]  Phone: [phone] Fax: [phone]      Initial Assessment (most recent)       Adult Discharge Assessment - 11/21/24 1619          Discharge Assessment    Assessment Type Discharge Planning Assessment     Confirmed/corrected address, phone number and insurance Yes     Source of Information patient;family     Does patient/caregiver understand observation status Yes     Communicated DARREN with patient/caregiver Yes     People in Home child(floresita), adult;spouse     Do you expect to return to your current living situation? Yes     Do you have help at home or someone to help you manage your care at home? Yes     Who are your caregiver(s) and their phone number(s)? SpouseFiliberto [phone]     Prior to hospitilization cognitive status: Alert/Oriented     Current cognitive status: Alert/Oriented     Walking or Climbing Stairs Difficulty no     Dressing/Bathing Difficulty no     Home Accessibility stairs to  enter home     Home Layout Able to live on 1st floor     Equipment Currently Used at Home none     Readmission within 30 days? No     Patient currently being followed by outpatient case management? No     Do you currently have service(s) that help you manage your care at home? No     Do you take prescription medications? Yes     Do you have prescription coverage? Yes     Coverage [facility]     Do you have any problems affording any of your prescribed medications? No     Is the patient taking medications as prescribed? yes     How do you get to doctors appointments? car, drives self     Are you on dialysis? No     Do you take coumadin? No     Discharge Plan A Home with family     Discharge Plan B Home     DME Needed Upon Discharge  none     Discharge Plan discussed with: Patient;Spouse/sig other     Transition of Care Barriers None        Physical Activity    On average, how many days per week do you engage in moderate to strenuous exercise (like a brisk walk)? 2 days     On average, how many minutes do you engage in exercise at this level? 30 min        Financial Resource Strain    How hard is it for you to pay for the very basics like food, housing, medical care, and heating? Not hard at all        Housing Stability    In the last 12 months, was there a time when you were not able to pay the mortgage or rent on time? No     At any time in the past 12 months, were you homeless or living in a shelter (including now)? No        Transportation Needs    Has the lack of transportation kept you from medical appointments, meetings, work or from getting things needed for daily living? No        Food Insecurity    Within the past 12 months, you worried that your food would run out before you got the money to buy more. Never true     Within the past 12 months, the food you bought just didn't last and you didn't have money to get more. Never true        Stress    Do you feel stress - tense, restless, nervous, or anxious, or unable  to sleep at night because your mind is troubled all the time - these days? Only a little        Social Isolation    How often do you feel lonely or isolated from those around you?  Never        Alcohol Use    Q1: How often do you have a drink containing alcohol? Never     Q2: How many drinks containing alcohol do you have on a typical day when you are drinking? Patient does not drink     Q3: How often do you have six or more drinks on one occasion? Never        Utilities    In the past 12 months has the electric, gas, oil, or water company threatened to shut off services in your home? No        Health Literacy    How often do you need to have someone help you when you read instructions, pamphlets, or other written material from your doctor or pharmacy? Never                   SW met with pt and her  at bedside and complete initial discharge assessment. Pt has no needs and will be transported home by her spouse.    Dispo: A. Home with family B. Home no needs       Discharge Plan A and Plan B have been determined by review of patient's clinical status, future medical and therapeutic needs, and coverage/benefits for post-acute care in coordination with multidisciplinary team members.     Hortencia Spence MSW    Care Management

## 2024-11-21 NOTE — HOSPITAL COURSE
Admitted for failed outpatient treatment of CAP and on admission met criteria for sepsis.  Was previously on Augmentin with no improvement.  CT showed Right-sided multifocal pneumonia with likely reactive mediastinal and hilar lymph nodes; no effusions.  Placed on vanc, Zosyn, and azithromycin.  Sputum cultures normal respiratory rody.  MRSA screen negative.  SOB and cough improved with treatment.  Pt remained on room air and was afebrile for 24 hours prior to discharge.  Pt was seen walking the halls with IV pole in no respiratory distress.  Pt reported fatigue but overall significantly improved symptoms.  Given fever of 101.7 11/21 at 9:00 a.m., offered continued admission for further monitoring to ensure cultures would remain negative and fevers would subside.  Pt opted for discharge home with close outpatient follow up.  Prescription for Levaquin and doxycycline to complete total of 7 days provided prior to discharge.    The patient's chronic medical conditions were managed appropriately. Discharge plan of care was discussed at length with patient including patient's need for close outpatient follow-up. Medication counseling also took place with the patient being educated on potential side effects/adverse events.  Patient also counseled to take medicines as prescribed and do not drink alcohol, use tobacco products, or use illicit substances. Patient counseled to return to the hospital or seek medical care if baseline status should suddenly change, return of symptoms occurs, or for any new or concerning symptoms that arise. Patient was in agreement with plan of care going forward and was then discharged with recommendation for close follow up with PCP.    Physical Exam  Constitutional:  well-developed.   Cardiovascular: Normal rate and regular rhythm.   Pulmonary: Pulmonary effort is normal. Normal breath sounds  Abdominal: No distension, soft and nontender  Skin: warm and dry.   Neurological: alert and oriented  to person, place, and time.   Psychiatric:    Behavior normal.        UPDATE:  Contacted by microbiology following discharge:  1 aerobic blood culture growing Gram-positive rods later identified as bacillus species.  Called the pt who reported fatigue but said symptoms continued to improve.  No fevers, chills, worsening SOB, or worsening cough.  Likely contaminant as it was in 1/4 bottles and symptoms improved.  Urged the pt to keep outpatient follow up appt and return to the ED for any worsening symptoms.

## 2024-11-21 NOTE — ED NOTES
Report received from AREN Owens. Assume care of pt. Pt resting comfortably and independently repositioned in stretcher with bed locked in lowest position for safety. NAD noted at this time. Respirations even and unlabored and visible chest rise noted.  Patient offered bathroom assistance and denies need at this time. Pt instructed to call if assistance is needed. Pt on continuous cardiac, BP, and O2 monitoring. Call light within reach. Family at bedside. No needs at this time. Will continue to monitor.          APPEARANCE: awake and alert in NAD.  SKIN: warm, dry and intact. No breakdown or bruising.  MUSCULOSKELETAL: Patient moving all extremities spontaneously, no obvious swelling or deformities noted. Ambulates independently.  RESPIRATORY: Shortness of breath.Respirations unlabored. Productive cough  CARDIAC: Denies CP, 2+ distal pulses; no peripheral edema  ABDOMEN: S/ND/NT, + nausea   : voids spontaneously, denies difficulty  Neurologic: AAO x 4; follows commands equal strength in all extremities; denies numbness/tingling. Denies dizziness, YAA, pupils 3mm.

## 2024-11-21 NOTE — PROGRESS NOTES
"Pharmacokinetic Initial Assessment & Plan: IV Vancomycin    IV Vancomycin 1500 mg x once given in the ED on 11/20 @ 1538  Then Vancomycin 1250 mg every 12 hours,   Obtain a Vancomycin trough 30 mins prior to the 4 th dose on 11/22 @ 0600   Desired empiric serum trough concentration is 15 to 20 mcg/mL    Pharmacy will continue to follow and monitor vancomycin.    [id number] with any questions regarding this assessment.     Thank you for the consult,   Lori Albarran         Patient brief summary:  Evelia Ramon is a 34 y.o. female initiated on antimicrobial therapy with IV Vancomycin for treatment of suspected  Pneumonia     Drug Allergies:   Review of patient's allergies indicates:   Allergen Reactions    Nut - unspecified Anaphylaxis       Actual Body Weight:   78.9 kg    Renal Function:   Estimated Creatinine Clearance: 106.1 mL/min (based on SCr of 0.8 mg/dL).,     CBC (last 72 hours):  Recent Labs   Lab Result Units 11/20/24  1258   WBC K/uL 5.57   Hemoglobin g/dL 13.6   Hematocrit % 40.6   Platelets K/uL 308   Gran % % 82.9*   Lymph % % 7.9*   Mono % % 4.3   Eosinophil % % 4.3   Basophil % % 0.2   Differential Method  Automated       Metabolic Panel (last 72 hours):  Recent Labs   Lab Result Units 11/20/24  1258 11/20/24  1358 11/20/24  1453   Sodium mmol/L 139  --   --    Potassium mmol/L 3.6  --   --    Chloride mmol/L 105  --   --    CO2 mmol/L 23  --   --    Glucose mg/dL 92  --   --    Glucose, UA   --   --  Negative   BUN mg/dL 5*  --   --    Creatinine mg/dL 0.8  --   --    Albumin g/dL 3.4*  --   --    Total Bilirubin mg/dL 0.2  --   --    Alkaline Phosphatase U/L 74  --   --    AST U/L 36  --   --    ALT U/L 28  --   --    Magnesium mg/dL  --  2.1  --        Drug levels (last 3 results):  No results for input(s): "VANCOMYCINRA", "VANCORANDOM", "VANCOMYCINPE", "VANCOPEAK", "VANCOMYCINTR", "VANCOTROUGH" in the last 72 hours.    Microbiologic Results:  Microbiology Results (last 7 days)       Procedure " Component Value Units Date/Time    MRSA Screen by PCR [[id number]] Collected: 11/20/24 1842    Order Status: Sent Specimen: Nasal Swab Updated: 11/20/24 1843    Culture, Respiratory with Gram Stain [[id number]]     Order Status: No result Specimen: Respiratory     Influenza A & B by Molecular [[id number]] Collected: 11/20/24 1343    Order Status: Completed Specimen: Nasopharyngeal Swab Updated: 11/20/24 1455     Influenza A, Molecular Negative     Influenza B, Molecular Negative     Flu A & B Source Nasal swab    Blood culture x two cultures. Draw prior to antibiotics. [[id number]] Collected: 11/20/24 1359    Order Status: Sent Specimen: Blood from Peripheral, Antecubital, Left Updated: 11/20/24 1403    Blood culture x two cultures. Draw prior to antibiotics. [[id number]] Collected: 11/20/24 1258    Order Status: Sent Specimen: Blood from Peripheral, Antecubital, Right Updated: 11/20/24 1309

## 2024-11-21 NOTE — PROGRESS NOTES
[facility] Medicine  Progress Note    Patient Name: Evelia Ramon  MRN: [id number]  Patient Class: IP- Inpatient   Admission Date: 11/20/2024  Length of Stay: 1 days  Attending Physician: Brian Escalante DO  Primary Care Provider: Hilda Box MD        Subjective:     Principal Problem:Sepsis due to pneumonia        HPI:  Evelia Ramon is a 34 y.o. female with PCOS and mild intermittent asthma who presents today with PNA.     She reports that approximately two weeks ago she developed respiratory symptoms including cough, which she attributed to a viral illness. Due to worsening, she presented to her PCP on Friday and was diagnosed with bacterial PNA based on CXR. She was started on Augmentin, with which she reports compliance. However, over the weekend, she continued to worsened and has since developed chills, generalized body aches, worsening cough, and mild SOB. Due to this, she presented again to her PCP and was referred to the ED for evaluation. She denies any smoking, and her asthma is well-controlled at baseline. No prior medical hospitalizations. She denies recent travel.     Overview/Hospital Course:  Admitted for PNA that failed outpt management with Augmentin.  CT showed Right-sided multifocal pneumonia with likely reactive mediastinal and hilar lymph nodes; no effusions.  Placed on broad coverage IV abx.  Sputum and blood cultures pending.  MRSA screen negative, but given she failed outpatient Augmentin I am concerned that she still may have MRSA pna so she remains on broad coverage.    Interval History:  Seen and evaluated on general medical floor  No acute events overnight    Clinical status improved  No new complaints  She does note that she has some L abd pain that is likely associated with all her coughing; she was informed to keep us updated if this worsens or evolves    Objective:     Vital Signs (Most Recent):  Temp: 99.9 °F (37.7 °C) (11/21/24 1137)  Pulse:  94 (11/21/24 1137)  Resp: 16 (11/21/24 0517)  BP: 114/74 (11/21/24 1137)  SpO2: 95 % (11/21/24 1137) Vital Signs (24h Range):  Temp:  [98.5 °F (36.9 °C)-101.7 °F (38.7 °C)] 99.9 °F (37.7 °C)  Pulse:  [] 94  Resp:  [16-20] 16  SpO2:  [94 %-100 %] 95 %  BP: (114-153)/(71-92) 114/74     Weight: 78 kg (171 lb 15.3 oz)  Body mass index is 26.54 kg/m².    Intake/Output Summary (Last 24 hours) at 11/21/2024 1258  Last data filed at 11/21/2024 0604  Gross per 24 hour   Intake 1551.04 ml   Output --   Net 1551.04 ml         Physical Exam  Vitals and nursing note reviewed.   Constitutional:       General: She is not in acute distress.     Appearance: She is well-developed. She is not diaphoretic.   HENT:      Head: Normocephalic and atraumatic.      Right Ear: External ear normal.      Left Ear: External ear normal.      Nose: Nose normal.   Eyes:      General: No scleral icterus.     Conjunctiva/sclera: Conjunctivae normal.   Neck:      Vascular: No JVD.   Cardiovascular:      Rate and Rhythm: Normal rate and regular rhythm.   Pulmonary:      Effort: Pulmonary effort is normal. No respiratory distress.      Comments: Decreased breath sounds in RLL, mild tachypnea without increased work of breathing, no wheezing   Abdominal:      General: There is no distension.      Tenderness: There is no abdominal tenderness.   Musculoskeletal:      Right lower leg: No edema.      Left lower leg: No edema.   Skin:     Coloration: Skin is not jaundiced or pale.   Neurological:      Mental Status: She is alert and oriented to person, place, and time.      Motor: No abnormal muscle tone.   Psychiatric:         Mood and Affect: Mood normal.         Behavior: Behavior normal.             Significant Labs: All pertinent labs within the past 24 hours have been reviewed.    Significant Imaging: I have reviewed all pertinent imaging results/findings within the past 24 hours.    Assessment/Plan:      * Sepsis due to pneumonia  Meets sepsis  criteria on admission with tachycardia, tachypnea, and fever  Evidence of R multifocal PNA on CXR and CT chest  Failed PO Augmentin; progressive symptoms and worsening on imaging  Remains stable on room air without hypotension or evidence of organ dysfunction  Lactate WNL  MRSA nares negative    Cont vanc/zosyn/azithromycin  COVID/flu and HIV negative  Sputum cx pending  Pressors not indicated in the absence of hypotension  Blood cultures pending; will follow and tailor antibiotics as appropriate  Source control with antibiotics  Duonebs  Likely ok to transition to oral regimen tomorrow with potential discharge    Mild intermittent asthma without complication  No evidence of acute exacerbation. Treatment of PNA as above, and supportive care.         VTE Risk Mitigation (From admission, onward)           Ordered     IP VTE LOW RISK PATIENT  Once         11/20/24 1853     Place sequential compression device  Until discontinued         11/20/24 1853                    Discharge Planning   DARREN:      Code Status: Full Code   Is the patient medically ready for discharge?:     Reason for patient still in hospital (select all that apply): Patient trending condition  Discharge Plan A: Home with family                  Brian Escalante DO  Department of Hospital Medicine   [facility] Surg

## 2024-11-21 NOTE — SUBJECTIVE & OBJECTIVE
Past Medical History:   Diagnosis Date    Allergy     Anemia     Asthma     Eczema     PCOS (polycystic ovarian syndrome)        Past Surgical History:   Procedure Laterality Date    CERVICAL CERCLAGE      WISDOM TOOTH EXTRACTION         Review of patient's allergies indicates:   Allergen Reactions    Nut - unspecified Anaphylaxis       Current Facility-Administered Medications on File Prior to Encounter   Medication    levonorgestreL (FLORIDALMA) 14 mcg/24 hrs (3 yrs) 13.5 mg IUD 14 mcg     Current Outpatient Medications on File Prior to Encounter   Medication Sig    albuterol (PROAIR HFA) 90 mcg/actuation inhaler Inhale 2 puffs into the lungs every 6 (six) hours as needed for Wheezing. Rescue    amoxicillin-clavulanate 875-125mg (AUGMENTIN) 875-125 mg per tablet Take 1 tablet by mouth every 12 (twelve) hours. for 7 days    EPINEPHrine (EPIPEN 2-DELVIS) 0.3 mg/0.3 mL AtIn Inject 0.3 mLs (0.3 mg total) into the muscle once as needed (Allergic reaction).    triamcinolone acetonide 0.1% (KENALOG) 0.1 % cream Apply topically 2 (two) times daily. for 7 days     Family History       Problem Relation (Age of Onset)    Arthritis Maternal Grandmother    Bone cancer Paternal Uncle    Dementia Maternal Grandmother    Diabetes Paternal Grandmother    Hypertension Mother, Maternal Grandmother, Paternal Grandmother    Osteoporosis Maternal Aunt          Tobacco Use    Smoking status: Never     Passive exposure: Never    Smokeless tobacco: Never   Substance and Sexual Activity    Alcohol use: No    Drug use: No    Sexual activity: Yes     Partners: Male     Birth control/protection: OCP     Review of Systems   All other systems reviewed and are negative.    Objective:     Vital Signs (Most Recent):  Temp: 99.6 °F (37.6 °C) (11/20/24 2001)  Pulse: 102 (11/20/24 1950)  Resp: 18 (11/20/24 1950)  BP: (!) 140/82 (11/20/24 1950)  SpO2: 100 % (11/20/24 1950) Vital Signs (24h Range):  Temp:  [98.7 °F (37.1 °C)-101.3 °F (38.5 °C)] 99.6 °F (37.6  °C)  Pulse:  [] 102  Resp:  [18-20] 18  SpO2:  [93 %-100 %] 100 %  BP: (136-147)/(79-90) 140/82     Weight: 78.9 kg (174 lb)  Body mass index is 27.25 kg/m².     Physical Exam  Vitals and nursing note reviewed.   Constitutional:       General: She is not in acute distress.     Appearance: She is well-developed. She is not diaphoretic.   HENT:      Head: Normocephalic and atraumatic.   Eyes:      General: No scleral icterus.     Conjunctiva/sclera: Conjunctivae normal.   Neck:      Vascular: No JVD.   Cardiovascular:      Rate and Rhythm: Tachycardia present.   Pulmonary:      Effort: Pulmonary effort is normal. No respiratory distress.      Comments: Decreased breath sounds in RLL, mild tachypnea without increased work of breathing, no wheezing   Abdominal:      General: There is no distension.      Tenderness: There is no abdominal tenderness.   Musculoskeletal:      Right lower leg: No edema.      Left lower leg: No edema.   Skin:     Coloration: Skin is not jaundiced or pale.   Neurological:      Mental Status: She is alert and oriented to person, place, and time.      Motor: No abnormal muscle tone.   Psychiatric:         Mood and Affect: Mood normal.         Behavior: Behavior normal.                Significant Labs: All pertinent labs within the past 24 hours have been reviewed.  CBC:   Recent Labs   Lab 11/20/24  1258   WBC 5.57   HGB 13.6   HCT 40.6        CMP:   Recent Labs   Lab 11/20/24  1258      K 3.6      CO2 23   GLU 92   BUN 5*   CREATININE 0.8   CALCIUM 9.1   PROT 9.0*   ALBUMIN 3.4*   BILITOT 0.2   ALKPHOS 74   AST 36   ALT 28   ANIONGAP 11       Significant Imaging: I have reviewed all pertinent imaging results/findings within the past 24 hours.

## 2024-11-21 NOTE — CARE UPDATE
[facility] Sepsis Follow Up Note     Patient's sepsis care was reviewed, and yes a perfusion exam was performed within 6 hours of sepsis presentation which shows adequate perfusion assessed by non-invasive monitoring on 11/20/2024.     Patient has received appropriate sepsis care and received a fluid challenge of 30 mL/kg (2,367 mL) LR bolus for treatment of sepsis.      HR improved - currently 90s on telemetry.  Afebrile, BP stable.    Susana Barnes, SABRINA  [facility]

## 2024-11-21 NOTE — SUBJECTIVE & OBJECTIVE
Interval History:  Seen and evaluated on general medical floor  No acute events overnight    Clinical status improved  No new complaints  She does note that she has some L abd pain that is likely associated with all her coughing; she was informed to keep us updated if this worsens or evolves    Objective:     Vital Signs (Most Recent):  Temp: 99.9 °F (37.7 °C) (11/21/24 1137)  Pulse: 94 (11/21/24 1137)  Resp: 16 (11/21/24 0517)  BP: 114/74 (11/21/24 1137)  SpO2: 95 % (11/21/24 1137) Vital Signs (24h Range):  Temp:  [98.5 °F (36.9 °C)-101.7 °F (38.7 °C)] 99.9 °F (37.7 °C)  Pulse:  [] 94  Resp:  [16-20] 16  SpO2:  [94 %-100 %] 95 %  BP: (114-153)/(71-92) 114/74     Weight: 78 kg (171 lb 15.3 oz)  Body mass index is 26.54 kg/m².    Intake/Output Summary (Last 24 hours) at 11/21/2024 1258  Last data filed at 11/21/2024 0604  Gross per 24 hour   Intake 1551.04 ml   Output --   Net 1551.04 ml         Physical Exam  Vitals and nursing note reviewed.   Constitutional:       General: She is not in acute distress.     Appearance: She is well-developed. She is not diaphoretic.   HENT:      Head: Normocephalic and atraumatic.      Right Ear: External ear normal.      Left Ear: External ear normal.      Nose: Nose normal.   Eyes:      General: No scleral icterus.     Conjunctiva/sclera: Conjunctivae normal.   Neck:      Vascular: No JVD.   Cardiovascular:      Rate and Rhythm: Normal rate and regular rhythm.   Pulmonary:      Effort: Pulmonary effort is normal. No respiratory distress.      Comments: Decreased breath sounds in RLL, mild tachypnea without increased work of breathing, no wheezing   Abdominal:      General: There is no distension.      Tenderness: There is no abdominal tenderness.   Musculoskeletal:      Right lower leg: No edema.      Left lower leg: No edema.   Skin:     Coloration: Skin is not jaundiced or pale.   Neurological:      Mental Status: She is alert and oriented to person, place, and time.       Motor: No abnormal muscle tone.   Psychiatric:         Mood and Affect: Mood normal.         Behavior: Behavior normal.             Significant Labs: All pertinent labs within the past 24 hours have been reviewed.    Significant Imaging: I have reviewed all pertinent imaging results/findings within the past 24 hours.

## 2024-11-21 NOTE — ASSESSMENT & PLAN NOTE
Meets sepsis criteria on admission with tachycardia, tachypnea, and fever  Evidence of R multifocal PNA on CXR and CT chest  Failed PO Augmentin; progressive symptoms and worsening on imaging  Remains stable on room air without hypotension or evidence of organ dysfunction  Lactate WNL  MRSA nares negative    Cont vanc/zosyn/azithromycin  COVID/flu and HIV negative  Sputum cx pending  Pressors not indicated in the absence of hypotension  Blood cultures pending; will follow and tailor antibiotics as appropriate  Source control with antibiotics  Duonebs  Likely ok to transition to oral regimen tomorrow with potential discharge

## 2024-11-21 NOTE — PLAN OF CARE
Problem: Adult Inpatient Plan of Care  Goal: Plan of Care Review  Outcome: Progressing  Goal: Patient-Specific Goal (Individualized)  Outcome: Progressing  Goal: Absence of Hospital-Acquired Illness or Injury  Outcome: Progressing  Goal: Optimal Comfort and Wellbeing  Outcome: Progressing  Goal: Readiness for Transition of Care  Outcome: Progressing     Problem: Sepsis/Septic Shock  Goal: Optimal Coping  Outcome: Progressing  Goal: Absence of Bleeding  Outcome: Progressing  Goal: Blood Glucose Level Within Targeted Range  Outcome: Progressing  Goal: Absence of Infection Signs and Symptoms  Outcome: Progressing  Goal: Optimal Nutrition Intake  Outcome: Progressing     Problem: Pneumonia  Goal: Fluid Balance  Outcome: Progressing  Goal: Resolution of Infection Signs and Symptoms  Outcome: Progressing  Goal: Effective Oxygenation and Ventilation  Outcome: Progressing   Patient health did not change. Patient still have complaints about headaches and vomiting throughout shift. Patient had on and off fevers. Fevers were controlled with tylenol.

## 2024-11-21 NOTE — HPI
Per admitting physician (11/20):  Evelia Ramon is a 34 y.o. female with PCOS and mild intermittent asthma who presents today with PNA.     She reports that approximately two weeks ago she developed respiratory symptoms including cough, which she attributed to a viral illness. Due to worsening, she presented to her PCP on Friday and was diagnosed with bacterial PNA based on CXR. She was started on Augmentin, with which she reports compliance. However, over the weekend, she continued to worsened and has since developed chills, generalized body aches, worsening cough, and mild SOB. Due to this, she presented again to her PCP and was referred to the ED for evaluation. She denies any smoking, and her asthma is well-controlled at baseline. No prior medical hospitalizations. She denies recent travel.

## 2024-11-21 NOTE — ED NOTES
Assumed care for pt after recieving report from nightshift RN. Pt. resting in bed in NAD, RR e/u. Vital signs stable and within desired limits at this time of assessment. Pt. offered bathroom assistance and denies need at this time. Explanation of care/wait provided. Pt verbalizes no needs at this time. Bed in low, locked position with rails up and call bell in reach. Pt's white board updated with today's care team and plan.     Patient identifiers for Evelia Ramon 34 y.o. female checked and correct.  Chief Complaint   Patient presents with    Pneumonia     Dx with pneumonia being treated outpatient by PCP but arrives needing breathing treatments.     Past Medical History:   Diagnosis Date    Allergy     Anemia     Asthma     Eczema     PCOS (polycystic ovarian syndrome)      Allergies reported:   Review of patient's allergies indicates:   Allergen Reactions    Nut - unspecified Anaphylaxis    Latex Rash         LOC: Patient is awake, alert, and aware of environment with an appropriate affect. Patient is oriented x 4 and speaking appropriately.  APPEARANCE: Patient resting comfortably and in no acute distress. Patient is clean and well groomed, patient's clothing is properly fastened.  HEENT: WDL  SKIN: The skin is warm and dry. Patient has normal skin turgor and moist mucus membranes.   MUSCULOSKELETAL: Patient is moving all extremities well, no obvious deformities noted. Pulses intact.   RESPIRATORY: Airway is open and patent. Respirations are spontaneous and mildly labored with increased effort and rate. Productive cough present   CARDIAC: Patient tachycardic, 120 on cardiac monitor. No peripheral edema noted. Denies chest pain and SOB at at time of assessment.   ABDOMEN: No distention noted. Soft and non-tender upon palpation.  NEUROLOGICAL: pupils 3mm, PERRL. Facial expression is symmetrical. Hand grasps are equal bilaterally. Normal sensation in all extremities when touched with finger.

## 2024-11-21 NOTE — ASSESSMENT & PLAN NOTE
Meets sepsis criteria on admission with tachycardia, tachypnea, and fever, and has evidence of R multifocal PNA on CXR and CT chest. Previously treated (now 4-5 days) with PO Augmentin, however with progressive symptoms and worsening on imaging. Remains stable on room air without hypotension or evidence of organ dysfunction. Lactate WNL.     -Given Vanc + Zosyn in the ED; will continue this and add Azithromycin for atypical coverage.  -COVID/flu and HIV negative.   -check PCT, sputum gram stain + culture, and MRSA screen; can likely de-escalate Vanc if negative   -Pressors not indicated in the absence of hypotension.  -Blood cultures pending; will follow and tailor antibiotics as appropriate.   -Source control with antibiotics.

## 2024-11-22 ENCOUNTER — PATIENT MESSAGE (OUTPATIENT)
Dept: PRIMARY CARE CLINIC | Facility: CLINIC | Age: 34
End: 2024-11-22
Payer: COMMERCIAL

## 2024-11-22 VITALS
TEMPERATURE: 99 F | WEIGHT: 171.94 LBS | SYSTOLIC BLOOD PRESSURE: 127 MMHG | HEIGHT: 68 IN | BODY MASS INDEX: 26.06 KG/M2 | DIASTOLIC BLOOD PRESSURE: 83 MMHG | RESPIRATION RATE: 18 BRPM | OXYGEN SATURATION: 95 % | HEART RATE: 79 BPM

## 2024-11-22 LAB
ACINETOBACTER CALCOACETICUS/BAUMANNII COMPLEX: NOT DETECTED
ALBUMIN SERPL BCP-MCNC: 2.4 G/DL (ref 3.5–5.2)
ANION GAP SERPL CALC-SCNC: 7 MMOL/L (ref 8–16)
BACTEROIDES FRAGILIS: NOT DETECTED
BUN SERPL-MCNC: 6 MG/DL (ref 6–20)
CALCIUM SERPL-MCNC: 8.1 MG/DL (ref 8.7–10.5)
CANDIDA ALBICANS: NOT DETECTED
CANDIDA AURIS: NOT DETECTED
CANDIDA GLABRATA: NOT DETECTED
CANDIDA KRUSEI: NOT DETECTED
CANDIDA PARAPSILOSIS: NOT DETECTED
CANDIDA TROPICALIS: NOT DETECTED
CHLORIDE SERPL-SCNC: 107 MMOL/L (ref 95–110)
CO2 SERPL-SCNC: 25 MMOL/L (ref 23–29)
CREAT SERPL-MCNC: 0.8 MG/DL (ref 0.5–1.4)
CRYPTOCOCCUS NEOFORMANS/GATTII: NOT DETECTED
CTX-M GENE (ESBL PRODUCER): NORMAL
ENTEROBACTER CLOACAE COMPLEX: NOT DETECTED
ENTEROBACTERALES: NOT DETECTED
ENTEROCOCCUS FAECALIS: NOT DETECTED
ENTEROCOCCUS FAECIUM: NOT DETECTED
ERYTHROCYTE [DISTWIDTH] IN BLOOD BY AUTOMATED COUNT: 12.2 % (ref 11.5–14.5)
ESCHERICHIA COLI: NOT DETECTED
EST. GFR  (NO RACE VARIABLE): >60 ML/MIN/1.73 M^2
GLUCOSE SERPL-MCNC: 82 MG/DL (ref 70–110)
HAEMOPHILUS INFLUENZAE: NOT DETECTED
HCT VFR BLD AUTO: 32.8 % (ref 37–48.5)
HGB BLD-MCNC: 10.6 G/DL (ref 12–16)
IMP GENE (CARBAPENEM RESISTANT): NORMAL
KLEBSIELLA AEROGENES: NOT DETECTED
KLEBSIELLA OXYTOCA: NOT DETECTED
KLEBSIELLA PNEUMONIAE GROUP: NOT DETECTED
KPC RESISTANCE GENE (CARBAPENEM): NORMAL
LISTERIA MONOCYTOGENES: NOT DETECTED
MAGNESIUM SERPL-MCNC: 2.2 MG/DL (ref 1.6–2.6)
MCH RBC QN AUTO: 28.5 PG (ref 27–31)
MCHC RBC AUTO-ENTMCNC: 32.3 G/DL (ref 32–36)
MCR-1: NORMAL
MCV RBC AUTO: 88 FL (ref 82–98)
MEC A/C AND MREJ (MRSA): NORMAL
MEC A/C: NORMAL
NDM GENE (CARBAPENEM RESISTANT): NORMAL
NEISSERIA MENINGITIDIS: NOT DETECTED
OXA-48-LIKE (CARBAPENEM RESISTANT): NORMAL
PHOSPHATE SERPL-MCNC: 3.6 MG/DL (ref 2.7–4.5)
PLATELET # BLD AUTO: 238 K/UL (ref 150–450)
PMV BLD AUTO: 9.6 FL (ref 9.2–12.9)
POTASSIUM SERPL-SCNC: 3.6 MMOL/L (ref 3.5–5.1)
PROTEUS SPECIES: NOT DETECTED
PSEUDOMONAS AERUGINOSA: NOT DETECTED
RBC # BLD AUTO: 3.72 M/UL (ref 4–5.4)
SALMONELLA SP: NOT DETECTED
SERRATIA MARCESCENS: NOT DETECTED
SODIUM SERPL-SCNC: 139 MMOL/L (ref 136–145)
STAPHYLOCOCCUS AUREUS: NOT DETECTED
STAPHYLOCOCCUS EPIDERMIDIS: NOT DETECTED
STAPHYLOCOCCUS LUGDUNESIS: NOT DETECTED
STAPHYLOCOCCUS SPECIES: NOT DETECTED
STENOTROPHOMONAS MALTOPHILIA: NOT DETECTED
STREPTOCOCCUS AGALACTIAE: NOT DETECTED
STREPTOCOCCUS PNEUMONIAE: NOT DETECTED
STREPTOCOCCUS PYOGENES: NOT DETECTED
STREPTOCOCCUS SPECIES: NOT DETECTED
VAN A/B (VRE GENE): NORMAL
VANCOMYCIN TROUGH SERPL-MCNC: 15.5 UG/ML (ref 10–22)
VIM GENE (CARBAPENEM RESISTANT): NORMAL
WBC # BLD AUTO: 5.08 K/UL (ref 3.9–12.7)

## 2024-11-22 PROCEDURE — 83735 ASSAY OF MAGNESIUM: CPT

## 2024-11-22 PROCEDURE — 25000003 PHARM REV CODE 250: Performed by: STUDENT IN AN ORGANIZED HEALTH CARE EDUCATION/TRAINING PROGRAM

## 2024-11-22 PROCEDURE — 80202 ASSAY OF VANCOMYCIN: CPT | Performed by: STUDENT IN AN ORGANIZED HEALTH CARE EDUCATION/TRAINING PROGRAM

## 2024-11-22 PROCEDURE — 80069 RENAL FUNCTION PANEL: CPT

## 2024-11-22 PROCEDURE — 85027 COMPLETE CBC AUTOMATED: CPT

## 2024-11-22 PROCEDURE — 94640 AIRWAY INHALATION TREATMENT: CPT

## 2024-11-22 PROCEDURE — 63600175 PHARM REV CODE 636 W HCPCS: Performed by: STUDENT IN AN ORGANIZED HEALTH CARE EDUCATION/TRAINING PROGRAM

## 2024-11-22 PROCEDURE — 25000242 PHARM REV CODE 250 ALT 637 W/ HCPCS

## 2024-11-22 PROCEDURE — 25000003 PHARM REV CODE 250: Performed by: FAMILY MEDICINE

## 2024-11-22 PROCEDURE — 36415 COLL VENOUS BLD VENIPUNCTURE: CPT | Performed by: STUDENT IN AN ORGANIZED HEALTH CARE EDUCATION/TRAINING PROGRAM

## 2024-11-22 PROCEDURE — 94761 N-INVAS EAR/PLS OXIMETRY MLT: CPT

## 2024-11-22 RX ORDER — BENZONATATE 100 MG/1
100 CAPSULE ORAL 3 TIMES DAILY PRN
Qty: 30 CAPSULE | Refills: 0 | Status: SHIPPED | OUTPATIENT
Start: 2024-11-22 | End: 2024-12-02

## 2024-11-22 RX ORDER — CETIRIZINE HYDROCHLORIDE 5 MG/1
5 TABLET ORAL DAILY
Status: DISCONTINUED | OUTPATIENT
Start: 2024-11-22 | End: 2024-11-22 | Stop reason: HOSPADM

## 2024-11-22 RX ORDER — LEVOFLOXACIN 750 MG/1
750 TABLET ORAL DAILY
Qty: 5 TABLET | Refills: 0 | Status: SHIPPED | OUTPATIENT
Start: 2024-11-22 | End: 2024-11-26 | Stop reason: SDUPTHER

## 2024-11-22 RX ORDER — CETIRIZINE HYDROCHLORIDE 5 MG/1
5 TABLET ORAL DAILY
COMMUNITY
Start: 2024-11-23 | End: 2025-11-23

## 2024-11-22 RX ORDER — DOXYCYCLINE 100 MG/1
100 CAPSULE ORAL EVERY 12 HOURS
Qty: 10 CAPSULE | Refills: 0 | Status: SHIPPED | OUTPATIENT
Start: 2024-11-22 | End: 2024-11-27

## 2024-11-22 RX ADMIN — PIPERACILLIN SODIUM AND TAZOBACTAM SODIUM 4.5 G: 4; .5 INJECTION, POWDER, FOR SOLUTION INTRAVENOUS at 05:11

## 2024-11-22 RX ADMIN — IPRATROPIUM BROMIDE AND ALBUTEROL SULFATE 3 ML: .5; 3 SOLUTION RESPIRATORY (INHALATION) at 01:11

## 2024-11-22 RX ADMIN — IPRATROPIUM BROMIDE AND ALBUTEROL SULFATE 3 ML: .5; 3 SOLUTION RESPIRATORY (INHALATION) at 07:11

## 2024-11-22 RX ADMIN — IPRATROPIUM BROMIDE AND ALBUTEROL SULFATE 3 ML: .5; 3 SOLUTION RESPIRATORY (INHALATION) at 12:11

## 2024-11-22 RX ADMIN — VANCOMYCIN HYDROCHLORIDE 1250 MG: 1.25 INJECTION, POWDER, LYOPHILIZED, FOR SOLUTION INTRAVENOUS at 05:11

## 2024-11-22 RX ADMIN — BENZONATATE 100 MG: 100 CAPSULE ORAL at 05:11

## 2024-11-22 RX ADMIN — CETIRIZINE HYDROCHLORIDE 5 MG: 5 TABLET, FILM COATED ORAL at 08:11

## 2024-11-22 RX ADMIN — Medication 6 MG: at 01:11

## 2024-11-22 NOTE — PLAN OF CARE
Problem: Adult Inpatient Plan of Care  Goal: Plan of Care Review  Outcome: Not Progressing  Goal: Patient-Specific Goal (Individualized)  Outcome: Not Progressing  Goal: Absence of Hospital-Acquired Illness or Injury  Outcome: Not Progressing  Goal: Optimal Comfort and Wellbeing  Outcome: Not Progressing  Goal: Readiness for Transition of Care  Outcome: Not Progressing     Problem: Sepsis/Septic Shock  Goal: Optimal Coping  Outcome: Not Progressing  Goal: Absence of Bleeding  Outcome: Not Progressing  Goal: Blood Glucose Level Within Targeted Range  Outcome: Not Progressing  Goal: Absence of Infection Signs and Symptoms  Outcome: Not Progressing  Goal: Optimal Nutrition Intake  Outcome: Not Progressing     Problem: Pneumonia  Goal: Fluid Balance  Outcome: Not Progressing  Goal: Resolution of Infection Signs and Symptoms  Outcome: Not Progressing  Goal: Effective Oxygenation and Ventilation  Outcome: Not Progressing     Problem: Gas Exchange Impaired  Goal: Optimal Gas Exchange  Outcome: Not Progressing

## 2024-11-22 NOTE — PLAN OF CARE
Pt discharged to home with belongings and family. Ivs removed. Discharge packet discussed with pt and pts family. All questions answered. New meds delivered to bedside.

## 2024-11-22 NOTE — DISCHARGE INSTRUCTIONS
Seek medical attention for worsening cough, SOB, persistent fevers or other developing symptoms such as chest pain, palpitations, abdominal pain, nausea/vomiting.    Avoid tobacco products, alcohol, illicit substances.    Obtain a pulse oximeter. If oxygen saturation remains continuously less than 90%, seek medical attention.    As discussed, possible but rare side-effects of ciprofloxacin (Cipro) include pathologic widening of the aorta (large blood vessel), bacterial colitis, abnormalities of the heart rhythm, tendinopathy or rupture of tendons.  Administer at least 2 hours before or after any zinc, aluminum, magnesium, calcium, or iron containing oral products including antacids, dairy products, vitamins.    Follow-up with PCP within 1-2 weeks. It is important to note that each of these medications that I have prescribed typically will not have refills. This is so your primary care physician or other specialists in the outpatient setting can review your progress and determine if these medications are to be continued. If they determine the medications need to be continued, make sure that you remind them about refills at your follow-up appointments.

## 2024-11-23 LAB
BACTERIA SPEC AEROBE CULT: NORMAL
BACTERIA SPEC AEROBE CULT: NORMAL
GRAM STN SPEC: NORMAL

## 2024-11-24 LAB
BACTERIA BLD CULT: ABNORMAL

## 2024-11-25 ENCOUNTER — TELEPHONE (OUTPATIENT)
Dept: PRIMARY CARE CLINIC | Facility: CLINIC | Age: 34
End: 2024-11-25
Payer: COMMERCIAL

## 2024-11-25 ENCOUNTER — PATIENT OUTREACH (OUTPATIENT)
Dept: ADMINISTRATIVE | Facility: CLINIC | Age: 34
End: 2024-11-25
Payer: COMMERCIAL

## 2024-11-25 LAB — BACTERIA BLD CULT: NORMAL

## 2024-11-25 NOTE — TELEPHONE ENCOUNTER
"CINDY    Pt coming in tomorrow, I called and sp w pt, she states that she vomited once earlier today and threw up one doxycycline and one levofloxacin, she retook the meds after emesis episode.     Pt states that when she feels the need for the Proair inhaler she takes 3 or 4 puffs because it feels like she is having trouble "fully emptying her lungs". Pt states she has not needed the inhaler at all today but used it more than once yesterday.  "

## 2024-11-25 NOTE — PROGRESS NOTES
C3 nurse spoke with Evelia Ramon for a TCC post hospital discharge follow up call. The patient has a scheduled [facility] appointment with Hilda Box MD on 11/26/24 @ 8227.

## 2024-11-25 NOTE — TELEPHONE ENCOUNTER
Probiotic otc 1 gummy daily    @ [phone] secure high priority message to pcp staff regarding pt stated concerns

## 2024-11-25 NOTE — TELEPHONE ENCOUNTER
----- Message from AREN Carlson sent at 11/25/2024 11:37 AM CST -----  Regarding: tcc post-d/c call  Spoke with patient for tcc post-d/c call. Please contact and advise regarding patient stated questions and / or concerns. Thank you.    Pt c/o vomiting, x 1 episode, on this am. Pt states both ABx's in emesis, retook these medications, will be short on ABx course     Pt voiced utilizing Proair more frequently, using 2-3 times within the hour       Please do not reply to this message, as this inbox is not routinely monitored.

## 2024-11-26 ENCOUNTER — OFFICE VISIT (OUTPATIENT)
Dept: PRIMARY CARE CLINIC | Facility: CLINIC | Age: 34
End: 2024-11-26
Payer: COMMERCIAL

## 2024-11-26 VITALS
HEIGHT: 68 IN | OXYGEN SATURATION: 97 % | DIASTOLIC BLOOD PRESSURE: 72 MMHG | WEIGHT: 170.44 LBS | TEMPERATURE: 98 F | SYSTOLIC BLOOD PRESSURE: 124 MMHG | BODY MASS INDEX: 25.83 KG/M2 | HEART RATE: 90 BPM

## 2024-11-26 DIAGNOSIS — J45.20 MILD INTERMITTENT ASTHMA WITHOUT COMPLICATION: ICD-10-CM

## 2024-11-26 DIAGNOSIS — E83.51 HYPOCALCEMIA: ICD-10-CM

## 2024-11-26 DIAGNOSIS — Z09 HOSPITAL DISCHARGE FOLLOW-UP: Primary | ICD-10-CM

## 2024-11-26 DIAGNOSIS — Z87.01 HISTORY OF PNEUMONIA: ICD-10-CM

## 2024-11-26 DIAGNOSIS — D64.9 ANEMIA, UNSPECIFIED TYPE: ICD-10-CM

## 2024-11-26 PROCEDURE — 1160F RVW MEDS BY RX/DR IN RCRD: CPT | Mod: CPTII,S$GLB,, | Performed by: STUDENT IN AN ORGANIZED HEALTH CARE EDUCATION/TRAINING PROGRAM

## 2024-11-26 PROCEDURE — 3044F HG A1C LEVEL LT 7.0%: CPT | Mod: CPTII,S$GLB,, | Performed by: STUDENT IN AN ORGANIZED HEALTH CARE EDUCATION/TRAINING PROGRAM

## 2024-11-26 PROCEDURE — 99999 PR PBB SHADOW E&M-EST. PATIENT-LVL IV: CPT | Mod: PBBFAC,,, | Performed by: STUDENT IN AN ORGANIZED HEALTH CARE EDUCATION/TRAINING PROGRAM

## 2024-11-26 PROCEDURE — 3078F DIAST BP <80 MM HG: CPT | Mod: CPTII,S$GLB,, | Performed by: STUDENT IN AN ORGANIZED HEALTH CARE EDUCATION/TRAINING PROGRAM

## 2024-11-26 PROCEDURE — 1111F DSCHRG MED/CURRENT MED MERGE: CPT | Mod: CPTII,S$GLB,, | Performed by: STUDENT IN AN ORGANIZED HEALTH CARE EDUCATION/TRAINING PROGRAM

## 2024-11-26 PROCEDURE — 1159F MED LIST DOCD IN RCRD: CPT | Mod: CPTII,S$GLB,, | Performed by: STUDENT IN AN ORGANIZED HEALTH CARE EDUCATION/TRAINING PROGRAM

## 2024-11-26 PROCEDURE — 99214 OFFICE O/P EST MOD 30 MIN: CPT | Mod: S$GLB,,, | Performed by: STUDENT IN AN ORGANIZED HEALTH CARE EDUCATION/TRAINING PROGRAM

## 2024-11-26 PROCEDURE — 3074F SYST BP LT 130 MM HG: CPT | Mod: CPTII,S$GLB,, | Performed by: STUDENT IN AN ORGANIZED HEALTH CARE EDUCATION/TRAINING PROGRAM

## 2024-11-26 RX ORDER — DOXYCYCLINE HYCLATE 100 MG
100 TABLET ORAL 2 TIMES DAILY
Qty: 1 TABLET | Refills: 0 | Status: SHIPPED | OUTPATIENT
Start: 2024-11-26 | End: 2024-11-27

## 2024-11-26 RX ORDER — LEVOFLOXACIN 750 MG/1
750 TABLET ORAL DAILY
Qty: 1 TABLET | Refills: 0 | Status: SHIPPED | OUTPATIENT
Start: 2024-11-26

## 2024-11-26 NOTE — PROGRESS NOTES
Office visit  Patient: Evelia Ramon   11/26/2024       Assessment/Plan:       1. Hospital discharge follow-up       -patient significantly improved from hospitalization       -discussed that cough is likely to linger       -patient missed a dose of antibiotics, so this was prescribed    2. Mild intermittent asthma without complication       -stable, continue prn albuterol    3. Hypocalcemia  -     BASIC METABOLIC PANEL; Future; Expected date: 12/26/2024        -will recheck BMP    4. Anemia, unspecified type  -     CBC W/ AUTO DIFFERENTIAL; Future; Expected date: 12/26/2024        -will recheck CBC    5. History of pneumonia  -     X-Ray Chest PA And Lateral; Future; Expected date: 11/26/2024    Other orders  -     doxycycline (VIBRA-TABS) 100 MG tablet; Take 1 tablet (100 mg total) by mouth 2 (two) times daily. Missing dose of antibiotic for 1 dose  Dispense: 1 tablet; Refill: 0  -     levoFLOXacin (LEVAQUIN) 750 MG tablet; Take 1 tablet (750 mg total) by mouth once daily.  Dispense: 1 tablet; Refill: 0        CHIEF COMPLAINT: hospital follow up    HPI: Evelia Ramon is a 34 y.o. female who presents for hospital follow up for sepsis due to pneumonia.  She was admitted on 11/20/2024 and discharged on 11/22/2024    She's still having fatigue.  Also reports diarrhea.  She has a knot in her right calf.  She still feels chills and like she has a fever, but hasn't actually had an elevated temperature.  She mostly just has a cough at night and in the morning.  She only takes the Tessalon perles occasionally.  Slight sore throat, but no rhinorrhea or congestion.        Current Outpatient Medications   Medication Instructions    albuterol (PROAIR HFA) 90 mcg/actuation inhaler 2 puffs, Inhalation, Every 6 hours PRN, Rescue    benzonatate (TESSALON) 100 mg, Oral, 3 times daily PRN    cetirizine (ZYRTEC) 5 mg, Oral, Daily    doxycycline (VIBRAMYCIN) 100 mg, Oral, Every 12 hours    EPINEPHrine (EPIPEN 2-DELVIS) 0.3 mg,  "Intramuscular, Once as needed    levoFLOXacin (LEVAQUIN) 750 mg, Oral, Daily       Lab Results   Component Value Date    HGBA1C 5.4 04/09/2024     No results found for: "MICALBCREAT"  Lab Results   Component Value Date    LDLCALC 100.2 04/09/2024    CHOL 156 04/09/2024    HDL 48 04/09/2024    TRIG 39 04/09/2024       Lab Results   Component Value Date     11/22/2024    K 3.6 11/22/2024     11/22/2024    CO2 25 11/22/2024    GLU 82 11/22/2024    BUN 6 11/22/2024    CREATININE 0.8 11/22/2024    CALCIUM 8.1 (L) 11/22/2024    PROT 9.0 (H) 11/20/2024    ALBUMIN 2.4 (L) 11/22/2024    BILITOT 0.2 11/20/2024    ALKPHOS 74 11/20/2024    AST 36 11/20/2024    ALT 28 11/20/2024    ANIONGAP 7 (L) 11/22/2024    ESTGFRAFRICA >105 08/04/2021    EGFRNONAA >60.0 06/12/2020    WBC 5.08 11/22/2024    HGB 10.6 (L) 11/22/2024    HGB 13.6 11/20/2024    HCT 32.8 (L) 11/22/2024    MCV 88 11/22/2024     11/22/2024    TSH 1.769 04/09/2024    HEPCAB Non-reactive 11/20/2024       Lab Results   Component Value Date    FERRITIN 47 11/06/2024    IRON 66 11/06/2024    TRANSFERRIN 217 11/06/2024    TIBC 321 11/06/2024    FESATURATED 21 11/06/2024         Past Medical History:   Diagnosis Date    Allergy     Anemia     Asthma     Eczema     PCOS (polycystic ovarian syndrome)      Past Surgical History:   Procedure Laterality Date    CERVICAL CERCLAGE      WISDOM TOOTH EXTRACTION         Social History     Tobacco Use    Smoking status: Never     Passive exposure: Never    Smokeless tobacco: Never   Substance Use Topics    Alcohol use: No    Drug use: No       family history includes Arthritis in her maternal grandmother; Bone cancer in her paternal uncle; Dementia in her maternal grandmother; Diabetes in her paternal grandmother; Hypertension in her maternal grandmother, mother, and paternal grandmother; Osteoporosis in her maternal aunt.    Vitals:    11/26/24 0839   BP: 124/72   Pulse: 90   Temp: 97.7 °F (36.5 °C)   TempSrc: Oral " "  SpO2: 97%   Weight: 77.3 kg (170 lb 6.7 oz)   Height: 5' 7.5" (1.715 m)   PainSc:   2   PainLoc: Back     Objective:   Physical Exam        Hilda Box MD  Internal Medicine and Pediatrics                            "

## 2024-11-26 NOTE — DISCHARGE SUMMARY
[facility] Medicine  Discharge Summary      Patient Name: Evelia Ramon  MRN: [id number]  ANTHONY: [id number]  Patient Class: IP- Inpatient  Admission Date: 11/20/2024  Hospital Length of Stay: 2 days  Discharge Date and Time: 11/22/2024  5:40 PM  Attending Physician: Adele att. providers found   Discharging Provider: Stanley Mckinnon III, MD  Primary Care Provider: Hilda Box MD  [facility] Medicine Team: [facility] Stanley Mckinnon III, MD  Primary Care Team: [facility]    HPI:   Per admitting physician (11/20):  Evelia Ramon is a 34 y.o. female with PCOS and mild intermittent asthma who presents today with PNA.     She reports that approximately two weeks ago she developed respiratory symptoms including cough, which she attributed to a viral illness. Due to worsening, she presented to her PCP on Friday and was diagnosed with bacterial PNA based on CXR. She was started on Augmentin, with which she reports compliance. However, over the weekend, she continued to worsened and has since developed chills, generalized body aches, worsening cough, and mild SOB. Due to this, she presented again to her PCP and was referred to the ED for evaluation. She denies any smoking, and her asthma is well-controlled at baseline. No prior medical hospitalizations. She denies recent travel.     * No surgery found *      Hospital Course:   Admitted for failed outpatient treatment of CAP and on admission met criteria for sepsis.  Was previously on Augmentin with no improvement.  CT showed Right-sided multifocal pneumonia with likely reactive mediastinal and hilar lymph nodes; no effusions.  Placed on vanc, Zosyn, and azithromycin.  Sputum cultures normal respiratory rody.  MRSA screen negative.  SOB and cough improved with treatment.  Pt remained on room air and was afebrile for 24 hours prior to discharge.  Pt was seen walking the halls with IV pole in no respiratory distress.  Pt reported  fatigue but overall significantly improved symptoms.  Given fever of 101.7 11/21 at 9:00 a.m., offered continued admission for further monitoring to ensure cultures would remain negative and fevers would subside.  Pt opted for discharge home with close outpatient follow up.  Prescription for Levaquin and doxycycline to complete total of 7 days provided prior to discharge.    The patient's chronic medical conditions were managed appropriately. Discharge plan of care was discussed at length with patient including patient's need for close outpatient follow-up. Medication counseling also took place with the patient being educated on potential side effects/adverse events.  Patient also counseled to take medicines as prescribed and do not drink alcohol, use tobacco products, or use illicit substances. Patient counseled to return to the hospital or seek medical care if baseline status should suddenly change, return of symptoms occurs, or for any new or concerning symptoms that arise. Patient was in agreement with plan of care going forward and was then discharged with recommendation for close follow up with PCP.    Physical Exam  Constitutional:  well-developed.   Cardiovascular: Normal rate and regular rhythm.   Pulmonary: Pulmonary effort is normal. Normal breath sounds  Abdominal: No distension, soft and nontender  Skin: warm and dry.   Neurological: alert and oriented to person, place, and time.   Psychiatric:    Behavior normal.        UPDATE:  Contacted by microbiology following discharge:  1 aerobic blood culture growing Gram-positive rods later identified as bacillus species.  Called the pt who reported fatigue but said symptoms continued to improve.  No fevers, chills, worsening SOB, or worsening cough.  Likely contaminant as it was in 1/4 bottles and symptoms improved.  Urged the pt to keep outpatient follow up appt and return to the ED for any worsening symptoms.     Goals of Care Treatment Preferences:  Code  Status: Full Code      SDOH Screening:  The patient was screened for utility difficulties, food insecurity, transport difficulties, housing insecurity, and interpersonal safety and there were no concerns identified this admission.     Consults:     No new Assessment & Plan notes have been filed under this hospital service since the last note was generated.  Service: Hospital Medicine    Final Active Diagnoses:    Diagnosis Date Noted POA    PRINCIPAL PROBLEM:  Sepsis due to pneumonia [J18.9, A41.9] 11/20/2024 Yes    Mild intermittent asthma without complication [J45.20] 08/08/2022 Yes      Problems Resolved During this Admission:       Discharged Condition: stable    Disposition: Home or Self Care    Follow Up:    Patient Instructions:      Diet Adult Regular     Activity as tolerated       Significant Diagnostic Studies: N/A    Pending Diagnostic Studies:       None           Medications:  Reconciled Home Medications:      Medication List        START taking these medications      benzonatate 100 MG capsule  Commonly known as: TESSALON  Take 1 capsule (100 mg total) by mouth 3 (three) times daily as needed for Cough.     cetirizine 5 MG tablet  Commonly known as: ZYRTEC  Take 1 tablet (5 mg total) by mouth once daily.     doxycycline 100 MG Cap  Commonly known as: VIBRAMYCIN  Take 1 capsule (100 mg total) by mouth every 12 (twelve) hours. for 5 days     levoFLOXacin 750 MG tablet  Commonly known as: LEVAQUIN  Take 1 tablet (750 mg total) by mouth once daily. for 5 days            CONTINUE taking these medications      albuterol 90 mcg/actuation inhaler  Commonly known as: PROAIR HFA  Inhale 2 puffs into the lungs every 6 (six) hours as needed for Wheezing. Rescue     EPINEPHrine 0.3 mg/0.3 mL Atin  Commonly known as: EPIPEN 2-DELVIS  Inject 0.3 mLs (0.3 mg total) into the muscle once as needed (Allergic reaction).            STOP taking these medications      amoxicillin-clavulanate 875-125mg 875-125 mg per  tablet  Commonly known as: AUGMENTIN     triamcinolone acetonide 0.1% 0.1 % cream  Commonly known as: KENALOG              Indwelling Lines/Drains at time of discharge:   Lines/Drains/Airways       None                   Time spent on the discharge of patient: 45 minutes         Stanley Mckinnon III, MD  Department of Hospital Medicine  [facility]

## 2024-12-04 ENCOUNTER — PATIENT MESSAGE (OUTPATIENT)
Dept: PRIMARY CARE CLINIC | Facility: CLINIC | Age: 34
End: 2024-12-04
Payer: COMMERCIAL

## 2024-12-05 NOTE — TELEPHONE ENCOUNTER
Called and sp w pt, she states that she would like appt with a provider tomorrow, she vu that Dr. Box is out of office until 12/18/24 and we have a 9:30 am and 11 am available today but nothing open tomorrow. She states that she will go to pharmacy and try otc Monistat first and if she changes her mind we can help her schedule same day appt at another location or she can go to  at her convenience.

## 2024-12-07 ENCOUNTER — ON-DEMAND VIRTUAL (OUTPATIENT)
Dept: URGENT CARE | Facility: CLINIC | Age: 34
End: 2024-12-07
Payer: COMMERCIAL

## 2024-12-07 DIAGNOSIS — N76.0 ACUTE VAGINITIS: Primary | ICD-10-CM

## 2024-12-07 PROCEDURE — 99213 OFFICE O/P EST LOW 20 MIN: CPT | Mod: 95,,, | Performed by: NURSE PRACTITIONER

## 2024-12-07 RX ORDER — FLUCONAZOLE 150 MG/1
150 TABLET ORAL
Qty: 2 TABLET | Refills: 0 | Status: SHIPPED | OUTPATIENT
Start: 2024-12-07 | End: 2024-12-11

## 2024-12-07 RX ORDER — METRONIDAZOLE 500 MG/1
500 TABLET ORAL EVERY 12 HOURS
Qty: 14 TABLET | Refills: 0 | Status: SHIPPED | OUTPATIENT
Start: 2024-12-07 | End: 2024-12-14

## 2024-12-08 NOTE — PROGRESS NOTES
Subjective:      Patient ID: Evelia Ramon is a 34 y.o. female.    Vitals:  vitals were not taken for this visit.     Chief Complaint: Vaginitis (Vaginal discharge and itching.  Used Monistat 1 3 days ago.)      Visit Type: TELE AUDIOVISUAL    Present with the patient at the time of consultation: TELEMED PRESENT WITH PATIENT: None    Past Medical History:   Diagnosis Date    Allergy     Anemia     Asthma     Eczema     PCOS (polycystic ovarian syndrome)      Past Surgical History:   Procedure Laterality Date    CERVICAL CERCLAGE      WISDOM TOOTH EXTRACTION       Review of patient's allergies indicates:   Allergen Reactions    Nut - unspecified Anaphylaxis    Latex Rash     Current Outpatient Medications on File Prior to Visit   Medication Sig Dispense Refill    albuterol (PROAIR HFA) 90 mcg/actuation inhaler Inhale 2 puffs into the lungs every 6 (six) hours as needed for Wheezing. Rescue 18 g 11    cetirizine (ZYRTEC) 5 MG tablet Take 1 tablet (5 mg total) by mouth once daily.      EPINEPHrine (EPIPEN 2-DELVIS) 0.3 mg/0.3 mL AtIn Inject 0.3 mLs (0.3 mg total) into the muscle once as needed (Allergic reaction). 2 each 3    [DISCONTINUED] levoFLOXacin (LEVAQUIN) 750 MG tablet Take 1 tablet (750 mg total) by mouth once daily. 1 tablet 0     Current Facility-Administered Medications on File Prior to Visit   Medication Dose Route Frequency Provider Last Rate Last Admin    levonorgestreL (FLORIDALMA) 14 mcg/24 hrs (3 yrs) 13.5 mg IUD 14 mcg  14 mcg Intrauterine  Katarzyna Thomas MD   14 mcg at 12/20/23 1345     Family History   Problem Relation Name Age of Onset    Hypertension Mother      Osteoporosis Maternal Aunt      Bone cancer Paternal Uncle      Hypertension Maternal Grandmother      Arthritis Maternal Grandmother      Dementia Maternal Grandmother      Hypertension Paternal Grandmother      Diabetes Paternal Grandmother         Medications Ordered                [id number] - [place] [id number]  [facility] [address] [facility]   [address] [facility] [address], [facility] [address]    Telephone: [phone]   Fax: [phone]   Hours: Not open 24 hours                         E-Prescribed (2 of 2)              fluconazole (DIFLUCAN) 150 MG Tab    Sig: Take 1 tablet (150 mg total) by mouth every 72 hours. for 2 doses       Start: 12/7/24     Quantity: 2 tablet Refills: 0                         metroNIDAZOLE (FLAGYL) 500 MG tablet    Sig: Take 1 tablet (500 mg total) by mouth every 12 (twelve) hours. for 7 days       Start: 12/7/24     Quantity: 14 tablet Refills: 0                           Ohs Peq Odvv Intake    12/7/2024  9:35 PM CST - Filed by Patient   What is your current physical address in the event of a medical emergency? [address]   Are you able to take your vital signs? No   Please attach any relevant images or files    Is your employer contracted with [facility]? No         Patient presents for virtual visit with c/o vaginal itch and discharge which began 3-4 days ago after being hospitalized and treated for pneumonia.  The patient has been using monistat one at home to try to relieve symptoms and had some improvement, but still having discharge and discomfort.  Two patient identifiers were used-name was repeated verbally as well as date of birth.  The patient is located her home in [place]            Genitourinary:  Positive for vaginal discharge and vaginal odor.        Vaginal itching        Objective:   The physical exam was conducted virtually.  Physical Exam   Constitutional: She is oriented to person, place, and time. No distress.   HENT:   Head: Normocephalic and atraumatic.   Neck: Neck supple.   Pulmonary/Chest: Effort normal. No respiratory distress.   Abdominal: Normal appearance.   Musculoskeletal: Normal range of motion.         General: Normal range of motion.   Neurological: no focal deficit. She is alert and oriented to person, place, and time.    Skin: Skin is not pale.   Psychiatric: Her behavior is normal. Mood, judgment and thought content normal.       Assessment:     1. Acute vaginitis        Plan:       Acute vaginitis    Other orders  -     fluconazole (DIFLUCAN) 150 MG Tab; Take 1 tablet (150 mg total) by mouth every 72 hours. for 2 doses  Dispense: 2 tablet; Refill: 0  -     metroNIDAZOLE (FLAGYL) 500 MG tablet; Take 1 tablet (500 mg total) by mouth every 12 (twelve) hours. for 7 days  Dispense: 14 tablet; Refill: 0    F/u with PCP or GYN if no improvement    You must understand that you've received a virtual Care treatment only and that you may be released before all your medical problems are known or treated. You, the patient, will arrange for follow up care as instructed.  If your condition worsens we recommend that you receive another evaluation at an urgent care in person, the emergency room or contact your primary medical clinics after hours call service to discuss your concerns.

## 2024-12-31 ENCOUNTER — HOSPITAL ENCOUNTER (OUTPATIENT)
Dept: RADIOLOGY | Facility: HOSPITAL | Age: 34
Discharge: HOME | End: 2024-12-31
Attending: STUDENT IN AN ORGANIZED HEALTH CARE EDUCATION/TRAINING PROGRAM
Payer: COMMERCIAL

## 2024-12-31 DIAGNOSIS — Z87.01 HISTORY OF PNEUMONIA: ICD-10-CM

## 2024-12-31 DIAGNOSIS — J18.9 RECURRENT PNEUMONIA: Primary | ICD-10-CM

## 2024-12-31 PROCEDURE — 71046 X-RAY EXAM CHEST 2 VIEWS: CPT | Mod: 26,,, | Performed by: RADIOLOGY

## 2024-12-31 PROCEDURE — 71046 X-RAY EXAM CHEST 2 VIEWS: CPT | Mod: TC,PN

## 2025-01-14 ENCOUNTER — OFFICE VISIT (OUTPATIENT)
Dept: PULMONOLOGY | Facility: CLINIC | Age: 35
End: 2025-01-14
Payer: COMMERCIAL

## 2025-01-14 DIAGNOSIS — J18.9 RECURRENT PNEUMONIA: ICD-10-CM

## 2025-01-14 DIAGNOSIS — J45.909 EXTRINSIC ASTHMA, UNSPECIFIED ASTHMA SEVERITY, UNSPECIFIED WHETHER COMPLICATED, UNSPECIFIED WHETHER PERSISTENT: Primary | ICD-10-CM

## 2025-01-14 DIAGNOSIS — J45.20 MILD INTERMITTENT ASTHMA WITHOUT COMPLICATION: ICD-10-CM

## 2025-01-14 PROCEDURE — 1160F RVW MEDS BY RX/DR IN RCRD: CPT | Mod: CPTII,95,, | Performed by: INTERNAL MEDICINE

## 2025-01-14 PROCEDURE — 98002 SYNCH AUDIO-VIDEO NEW MOD 45: CPT | Mod: 95,,, | Performed by: INTERNAL MEDICINE

## 2025-01-14 PROCEDURE — 1159F MED LIST DOCD IN RCRD: CPT | Mod: CPTII,95,, | Performed by: INTERNAL MEDICINE

## 2025-01-14 RX ORDER — BUDESONIDE AND FORMOTEROL FUMARATE DIHYDRATE 160; 4.5 UG/1; UG/1
2 AEROSOL RESPIRATORY (INHALATION) EVERY 12 HOURS PRN
Qty: 10.2 G | Refills: 6 | Status: SHIPPED | OUTPATIENT
Start: 2025-01-14 | End: 2026-01-14

## 2025-01-14 RX ORDER — ALBUTEROL SULFATE 90 UG/1
2 INHALANT RESPIRATORY (INHALATION) EVERY 6 HOURS PRN
Qty: 18 G | Refills: 11 | Status: SHIPPED | OUTPATIENT
Start: 2025-01-14

## 2025-01-14 NOTE — PROGRESS NOTES
General Pulmonary Clinic  New Patient Visit    The patient location is: [place]  The chief complaint leading to consultation is: unresolving PNA    Visit type: audiovisual    Face to Face time with patient: 24  40 minutes of total time spent on the encounter, which includes face to face time and non-face to face time preparing to see the patient (eg, review of tests), Obtaining and/or reviewing separately obtained history, Documenting clinical information in the electronic or other health record, Independently interpreting results (not separately reported) and communicating results to the patient/family/caregiver, or Care coordination (not separately reported).         Each patient to whom he or she provides medical services by telemedicine is:  (1) informed of the relationship between the physician and patient and the respective role of any other health care provider with respect to management of the patient; and (2) notified that he or she may decline to receive medical services by telemedicine and may withdraw from such care at any time.         GUMREET Ramon is a 34 y.o. female with a history of allergies, eczema and possible asthma presenting with chief complaint of RLL pneumonia and intermittent asthma    # RLL pneumonia  # intermittent asthma    Presented to PCP 1/15/24 note personally reviewed c/b of cough, low grade fever, myalgias, rhinorrhea. CXR performed noted a RLL infiltrate and she was rx augmentin for possible bacterial pneumonia. PCP note 1/20/24 reviewed -- despite abx she developed fever 102F, palpitations, cough, night sweats. CXR 1/20/24 persdonally reviewed with worsening RLL consolidation and she was sent to  ER. ED note 11/20/24 reviewed noted to be febrile 100.8 and tachycardic.     CT chest 11/20/2024 personally interpreted  -RUL patchy consolidation and bronchiolitis  -RLL dense consolidation  -R hilar/mediastinal, subclarinal ERROL    CXR 12/31/24 with persistent RLL  opacity overall improved from prior    Her symptoms have largely resolved. She continues to have intermittent cough productive of green mucus/yellow which is new for her.  She denies any shortness of breath. She does have intermittent sinus symptoms, seasonal allergies twice a year w/ clear/white cough.     She has a history of childhood asthma treated w/ multiple inhalers in childhood and improved after losing weight in young adulthood. She then became symptomatic during pregnancy in her late 20s. Albuterol PRN.  She was using albuterol daily. No acid reflux currently.  She denies dysphagia, coughing w/ meals    She is never smoker, no marijuana,  no vaping.  Exposures: therapist/. No mold or water damage in home/work     Objective   Past History     Past Medical History:  Past Medical History:   Diagnosis Date    Allergy     Anemia     Asthma     Eczema     PCOS (polycystic ovarian syndrome)          Past Surgical History:  Past Surgical History:   Procedure Laterality Date    CERVICAL CERCLAGE      WISDOM TOOTH EXTRACTION           Social History:   Social History     Socioeconomic History    Marital status:     Number of children: 1   Tobacco Use    Smoking status: Never     Passive exposure: Never    Smokeless tobacco: Never   Substance and Sexual Activity    Alcohol use: No    Drug use: No    Sexual activity: Yes     Partners: Male     Birth control/protection: OCP   Social History Narrative    , 18 month old daughter. . Moved here from [place] 2014.      Social Drivers of Health     Financial Resource Strain: Low Risk  (11/21/2024)    Overall Financial Resource Strain (CARDIA)     Difficulty of Paying Living Expenses: Not hard at all   Food Insecurity: No Food Insecurity (11/21/2024)    Hunger Vital Sign     Worried About Running Out of Food in the Last Year: Never true     Ran Out of Food in the Last Year: Never true   Transportation Needs: No Transportation Needs  (11/21/2024)    TRANSPORTATION NEEDS     Transportation : No   Physical Activity: Insufficiently Active (11/21/2024)    Exercise Vital Sign     Days of Exercise per Week: 2 days     Minutes of Exercise per Session: 30 min   Stress: No Stress Concern Present (11/21/2024)    [employer] - Occupational Stress Questionnaire     Feeling of Stress : Only a little   Recent Concern: Stress - Stress Concern Present (11/6/2024)    [employer] - Occupational Stress Questionnaire     Feeling of Stress : To some extent   Housing Stability: Low Risk  (11/21/2024)    Housing Stability Vital Sign     Unable to Pay for Housing in the Last Year: No     Homeless in the Last Year: No         Family Hx:  No family history of pulmonary fibrosis, pre-mature graying of hair, cirrhosis, or hematologic malignancies  No family history of emphysema or spontaneous PTX  no family history of lung cancer or lymphoma    Allergies and Pertinent Medications   Allergies and Medications Reviewed    Nut - unspecified and Latex  Current Outpatient Medications on File Prior to Visit   Medication Sig Dispense Refill    albuterol (PROAIR HFA) 90 mcg/actuation inhaler Inhale 2 puffs into the lungs every 6 (six) hours as needed for Wheezing. Rescue 18 g 11    EPINEPHrine (EPIPEN 2-DELVIS) 0.3 mg/0.3 mL AtIn Inject 0.3 mLs (0.3 mg total) into the muscle once as needed (Allergic reaction). 2 each 3     Current Facility-Administered Medications on File Prior to Visit   Medication Dose Route Frequency Provider Last Rate Last Admin    levonorgestreL (FLORIDALMA) 14 mcg/24 hrs (3 yrs) 13.5 mg IUD 14 mcg  14 mcg Intrauterine  Katarzyna Thomas MD   14 mcg at 12/20/23 1345   Supplements: probiotic daily, zyrtec           Physical Exam   There were no vitals taken for this visit.      Constitutional: No acute distress, Atraumatic   HEENT: moist mucus membranes, extraocular movements intact  Neurological: normal  "speech/travis, moves all extremities against gravity  Psych: Appropriate affect, normal mood       Diagnostic Studies      All diagnostic studies relevant to chief complaint reviewed personally    Labs:  Lab Results   Component Value Date    WBC 5.28 12/31/2024    HGB 11.7 (L) 12/31/2024     12/31/2024    MCV 89 12/31/2024     Lab Results   Component Value Date     12/31/2024    K 3.6 12/31/2024    CO2 25 12/31/2024    BUN 10 12/31/2024    CREATININE 0.8 12/31/2024    MG 2.2 11/22/2024     Lab Results   Component Value Date    AST 36 11/20/2024    ALT 28 11/20/2024    ALBUMIN 2.4 (L) 11/22/2024    PROT 9.0 (H) 11/20/2024    BILITOT 0.2 11/20/2024         PFTs:  Pulmonary Functions Testing Results:  No results found for: "FEV1", "FVC", "XFQ3AVV", "TLC", "DLCO"      FVC FEV1 FEV/FVC TLC DLCO 6MWT Interpret                                                         TTE:  No results found for this or any previous visit.            Assessment and Plan   Evelia Ramon is a 34 y.o. female  with a history of allergies, eczema and possible asthma presenting with chief complaint of RLL pneumonia and intermittent asthma    Problem List:  # unresolving RLL pneumonia - acute, stable - explained that RLL infiltrate may be delayed radiographic improvement. However in setting of persistent productive cough yellow-green phlegm will perform CT chest to r/o bronchiectasis or structural changes to lung parenchyma.  # intermittent asthma - chronic, stable - continue LINDA PRN. She was rx rescue ICS/LABA (symbicort 2 puff bid) to use in the setting of exacerbation  # seasonal allergies w/ allergic asthma - chronic, stable - refer to allergy      Differential, diagnoses, diagnostic work up, and possible treatments were discussed with the patient. Questions were answered.    Carolynn Roth MD  Pulmonary-Critical Care Medicine              For this visit, the following time was spent  preparing to see the patient (e.g., review " of tests) 14  minutes  obtaining and/or reviewing separately obtained history 0 minutes  Performing a medically necessary appropriate examination and/or evaluation 12 minutes  Counseling and educating the patient/family/caregiver 12 minutes  Ordering medications, tests, or procedures 2 minutes  Referring and communicating with other health care professionals (when not reported separately) 0minutes  Documenting clinical information in the electronic or other health record 5minutes  Care coordination (not reported separately) 0minutes    Total time = 45 minutes

## 2025-01-16 ENCOUNTER — PATIENT MESSAGE (OUTPATIENT)
Dept: PULMONOLOGY | Facility: CLINIC | Age: 35
End: 2025-01-16
Payer: COMMERCIAL

## 2025-01-21 NOTE — H&P
[facility] - Emergency Dept  [facility] Medicine  History & Physical    Patient Name: Evelia Ramon  MRN: [id number]  Patient Class: IP- Inpatient  Admission Date: 11/20/2024  Attending Physician: Zaid Dumont MD   Primary Care Provider: Hilda Box MD         Patient information was obtained from patient and past medical records.     Subjective:     Principal Problem:Sepsis due to pneumonia    Chief Complaint:   Chief Complaint   Patient presents with    Pneumonia     Dx with pneumonia being treated outpatient by PCP but arrives needing breathing treatments.        HPI: Evelia Ramon is a 34 y.o. female with PCOS and mild intermittent asthma who presents today with PNA.     She reports that approximately two weeks ago she developed respiratory symptoms including cough, which she attributed to a viral illness. Due to worsening, she presented to her PCP on Friday and was diagnosed with bacterial PNA based on CXR. She was started on Augmentin, with which she reports compliance. However, over the weekend, she continued to worsened and has since developed chills, generalized body aches, worsening cough, and mild SOB. Due to this, she presented again to her PCP and was referred to the ED for evaluation. She denies any smoking, and her asthma is well-controlled at baseline. No prior medical hospitalizations. She denies recent travel.     Past Medical History:   Diagnosis Date    Allergy     Anemia     Asthma     Eczema     PCOS (polycystic ovarian syndrome)        Past Surgical History:   Procedure Laterality Date    CERVICAL CERCLAGE      WISDOM TOOTH EXTRACTION         Review of patient's allergies indicates:   Allergen Reactions    Nut - unspecified Anaphylaxis       Current Facility-Administered Medications on File Prior to Encounter   Medication    levonorgestreL (FLORIDALMA) 14 mcg/24 hrs (3 yrs) 13.5 mg IUD 14 mcg     Current Outpatient Medications on File Prior to Encounter   Medication Sig     What Type Of Note Output Would You Prefer (Optional)?: Standard Output Hpi Title: Evaluation of Skin Lesions albuterol (PROAIR HFA) 90 mcg/actuation inhaler Inhale 2 puffs into the lungs every 6 (six) hours as needed for Wheezing. Rescue    amoxicillin-clavulanate 875-125mg (AUGMENTIN) 875-125 mg per tablet Take 1 tablet by mouth every 12 (twelve) hours. for 7 days    EPINEPHrine (EPIPEN 2-DELVIS) 0.3 mg/0.3 mL AtIn Inject 0.3 mLs (0.3 mg total) into the muscle once as needed (Allergic reaction).    triamcinolone acetonide 0.1% (KENALOG) 0.1 % cream Apply topically 2 (two) times daily. for 7 days     Family History       Problem Relation (Age of Onset)    Arthritis Maternal Grandmother    Bone cancer Paternal Uncle    Dementia Maternal Grandmother    Diabetes Paternal Grandmother    Hypertension Mother, Maternal Grandmother, Paternal Grandmother    Osteoporosis Maternal Aunt          Tobacco Use    Smoking status: Never     Passive exposure: Never    Smokeless tobacco: Never   Substance and Sexual Activity    Alcohol use: No    Drug use: No    Sexual activity: Yes     Partners: Male     Birth control/protection: OCP     Review of Systems   All other systems reviewed and are negative.    Objective:     Vital Signs (Most Recent):  Temp: 99.6 °F (37.6 °C) (11/20/24 2001)  Pulse: 102 (11/20/24 1950)  Resp: 18 (11/20/24 1950)  BP: (!) 140/82 (11/20/24 1950)  SpO2: 100 % (11/20/24 1950) Vital Signs (24h Range):  Temp:  [98.7 °F (37.1 °C)-101.3 °F (38.5 °C)] 99.6 °F (37.6 °C)  Pulse:  [] 102  Resp:  [18-20] 18  SpO2:  [93 %-100 %] 100 %  BP: (136-147)/(79-90) 140/82     Weight: 78.9 kg (174 lb)  Body mass index is 27.25 kg/m².     Physical Exam  Vitals and nursing note reviewed.   Constitutional:       General: She is not in acute distress.     Appearance: She is well-developed. She is not diaphoretic.   HENT:      Head: Normocephalic and atraumatic.   Eyes:      General: No scleral icterus.     Conjunctiva/sclera: Conjunctivae normal.   Neck:      Vascular: No JVD.   Cardiovascular:      Rate and Rhythm: Tachycardia present.    Pulmonary:      Effort: Pulmonary effort is normal. No respiratory distress.      Comments: Decreased breath sounds in RLL, mild tachypnea without increased work of breathing, no wheezing   Abdominal:      General: There is no distension.      Tenderness: There is no abdominal tenderness.   Musculoskeletal:      Right lower leg: No edema.      Left lower leg: No edema.   Skin:     Coloration: Skin is not jaundiced or pale.   Neurological:      Mental Status: She is alert and oriented to person, place, and time.      Motor: No abnormal muscle tone.   Psychiatric:         Mood and Affect: Mood normal.         Behavior: Behavior normal.                Significant Labs: All pertinent labs within the past 24 hours have been reviewed.  CBC:   Recent Labs   Lab 11/20/24  1258   WBC 5.57   HGB 13.6   HCT 40.6        CMP:   Recent Labs   Lab 11/20/24  1258      K 3.6      CO2 23   GLU 92   BUN 5*   CREATININE 0.8   CALCIUM 9.1   PROT 9.0*   ALBUMIN 3.4*   BILITOT 0.2   ALKPHOS 74   AST 36   ALT 28   ANIONGAP 11       Significant Imaging: I have reviewed all pertinent imaging results/findings within the past 24 hours.  Assessment/Plan:     * Sepsis due to pneumonia  Meets sepsis criteria on admission with tachycardia, tachypnea, and fever, and has evidence of R multifocal PNA on CXR and CT chest. Previously treated (now 4-5 days) with PO Augmentin, however with progressive symptoms and worsening on imaging. Remains stable on room air without hypotension or evidence of organ dysfunction. Lactate WNL.     -Given Vanc + Zosyn in the ED; will continue this and add Azithromycin for atypical coverage.  -COVID/flu and HIV negative.   -check PCT, sputum gram stain + culture, and MRSA screen; can likely de-escalate Vanc if negative   -Pressors not indicated in the absence of hypotension.  -Blood cultures pending; will follow and tailor antibiotics as appropriate.   -Source control with antibiotics.    Mild  intermittent asthma without complication  No evidence of acute exacerbation. Treatment of PNA as above, and supportive care.         VTE Risk Mitigation (From admission, onward)           Ordered     IP VTE LOW RISK PATIENT  Once         11/20/24 1853     Place sequential compression device  Until discontinued         11/20/24 1853                         Elfego Gee MD  Department of Hospital Medicine  [facility] - Emergency Dept

## 2025-01-28 DIAGNOSIS — J18.9 RECURRENT PNEUMONIA: Primary | ICD-10-CM

## 2025-01-30 ENCOUNTER — PATIENT MESSAGE (OUTPATIENT)
Dept: PULMONOLOGY | Facility: CLINIC | Age: 35
End: 2025-01-30
Payer: COMMERCIAL

## 2025-01-30 ENCOUNTER — HOSPITAL ENCOUNTER (OUTPATIENT)
Dept: RADIOLOGY | Facility: HOSPITAL | Age: 35
Discharge: HOME | End: 2025-01-30
Attending: INTERNAL MEDICINE
Payer: COMMERCIAL

## 2025-01-30 DIAGNOSIS — J18.9 RECURRENT PNEUMONIA: ICD-10-CM

## 2025-01-30 PROCEDURE — 71046 X-RAY EXAM CHEST 2 VIEWS: CPT | Mod: TC,PN

## 2025-01-30 PROCEDURE — 71046 X-RAY EXAM CHEST 2 VIEWS: CPT | Mod: 26,,, | Performed by: RADIOLOGY

## 2025-02-01 ENCOUNTER — HOSPITAL ENCOUNTER (OUTPATIENT)
Dept: RADIOLOGY | Facility: OTHER | Age: 35
Discharge: HOME | End: 2025-02-01
Attending: INTERNAL MEDICINE
Payer: COMMERCIAL

## 2025-02-01 DIAGNOSIS — J18.9 RECURRENT PNEUMONIA: ICD-10-CM

## 2025-02-01 PROCEDURE — 71250 CT THORAX DX C-: CPT | Mod: 26,,, | Performed by: RADIOLOGY

## 2025-02-01 PROCEDURE — 71250 CT THORAX DX C-: CPT | Mod: TC

## 2025-02-03 DIAGNOSIS — J18.9 RECURRENT PNEUMONIA: Primary | ICD-10-CM

## 2025-03-31 ENCOUNTER — OFFICE VISIT (OUTPATIENT)
Dept: PULMONOLOGY | Facility: CLINIC | Age: 35
End: 2025-03-31
Payer: COMMERCIAL

## 2025-03-31 DIAGNOSIS — J18.9 RECURRENT PNEUMONIA: Primary | ICD-10-CM

## 2025-03-31 PROCEDURE — 99499 UNLISTED E&M SERVICE: CPT | Mod: 95,,, | Performed by: INTERNAL MEDICINE

## 2025-04-21 ENCOUNTER — TELEPHONE (OUTPATIENT)
Dept: INFECTIOUS DISEASES | Facility: CLINIC | Age: 35
End: 2025-04-21
Payer: COMMERCIAL

## 2025-04-21 NOTE — TELEPHONE ENCOUNTER
Called pt and let her know that we have no appts available in this timeframe.     ----- Message from Katie sent at 4/21/2025  8:54 AM CDT -----  Regarding: Appt  Contact: [phone]  Who call ? Evelia Ramon What is the request Details : Pt calling to speak with someone in provider office regards scheduling an appt for travel clinic. States she would like to be scheduled before May 26. Next available 6/3.  Please call pt back.  Can clinic  use patient portal  : No What number to call back : [phone]

## 2025-05-17 ENCOUNTER — HOSPITAL ENCOUNTER (OUTPATIENT)
Dept: RADIOLOGY | Facility: OTHER | Age: 35
Discharge: HOME | End: 2025-05-17
Attending: INTERNAL MEDICINE
Payer: COMMERCIAL

## 2025-05-17 DIAGNOSIS — J18.9 RECURRENT PNEUMONIA: ICD-10-CM

## 2025-05-17 PROCEDURE — 71250 CT THORAX DX C-: CPT | Mod: 26,,, | Performed by: RADIOLOGY

## 2025-05-17 PROCEDURE — 71250 CT THORAX DX C-: CPT | Mod: TC

## 2025-05-19 ENCOUNTER — RESULTS FOLLOW-UP (OUTPATIENT)
Dept: PULMONOLOGY | Facility: CLINIC | Age: 35
End: 2025-05-19

## 2025-05-26 ENCOUNTER — OFFICE VISIT (OUTPATIENT)
Dept: PULMONOLOGY | Facility: CLINIC | Age: 35
End: 2025-05-26
Payer: COMMERCIAL

## 2025-05-26 DIAGNOSIS — J98.4 SCARRING OF LUNG: Primary | ICD-10-CM

## 2025-05-26 DIAGNOSIS — J45.20 MILD INTERMITTENT ASTHMA WITHOUT COMPLICATION: ICD-10-CM

## 2025-05-26 PROCEDURE — 98006 SYNCH AUDIO-VIDEO EST MOD 30: CPT | Mod: 95,,, | Performed by: INTERNAL MEDICINE

## 2025-05-26 PROCEDURE — 1160F RVW MEDS BY RX/DR IN RCRD: CPT | Mod: CPTII,95,, | Performed by: INTERNAL MEDICINE

## 2025-05-26 PROCEDURE — 1159F MED LIST DOCD IN RCRD: CPT | Mod: CPTII,95,, | Performed by: INTERNAL MEDICINE

## 2025-05-26 RX ORDER — PREDNISONE 20 MG/1
40 TABLET ORAL DAILY
Qty: 14 TABLET | Refills: 0 | Status: SHIPPED | OUTPATIENT
Start: 2025-05-26 | End: 2025-06-02

## 2025-05-26 RX ORDER — BUDESONIDE AND FORMOTEROL FUMARATE DIHYDRATE 160; 4.5 UG/1; UG/1
2 AEROSOL RESPIRATORY (INHALATION) EVERY 12 HOURS PRN
Qty: 10.2 G | Refills: 6 | Status: SHIPPED | OUTPATIENT
Start: 2025-05-26 | End: 2026-05-26

## 2025-05-26 RX ORDER — ALBUTEROL SULFATE 90 UG/1
2 INHALANT RESPIRATORY (INHALATION) EVERY 6 HOURS PRN
Qty: 18 G | Refills: 11 | Status: SHIPPED | OUTPATIENT
Start: 2025-05-26

## 2025-05-26 RX ORDER — PNEUMOCOCCAL 20-VALENT CONJUGATE VACCINE 2.2; 2.2; 2.2; 2.2; 2.2; 2.2; 2.2; 2.2; 2.2; 2.2; 2.2; 2.2; 2.2; 2.2; 2.2; 2.2; 4.4; 2.2; 2.2; 2.2 UG/.5ML; UG/.5ML; UG/.5ML; UG/.5ML; UG/.5ML; UG/.5ML; UG/.5ML; UG/.5ML; UG/.5ML; UG/.5ML; UG/.5ML; UG/.5ML; UG/.5ML; UG/.5ML; UG/.5ML; UG/.5ML; UG/.5ML; UG/.5ML; UG/.5ML; UG/.5ML
0.5 INJECTION, SUSPENSION INTRAMUSCULAR ONCE
Qty: 0.5 ML | Refills: 0 | Status: SHIPPED | OUTPATIENT
Start: 2025-05-26 | End: 2025-05-26

## 2025-05-26 NOTE — PROGRESS NOTES
General Pulmonary Clinic  Follow Up Patient Visit    The patient location is: [place]  The chief complaint leading to consultation is:  Visit type: audiovisual    Face to Face time with patient: 20  30 minutes of total time spent on the encounter, which includes face to face time and non-face to face time preparing to see the patient (eg, review of tests), Obtaining and/or reviewing separately obtained history, Documenting clinical information in the electronic or other health record, Independently interpreting results (not separately reported) and communicating results to the patient/family/caregiver, or Care coordination (not separately reported).         Each patient to whom he or she provides medical services by telemedicine is:  (1) informed of the relationship between the physician and patient and the respective role of any other health care provider with respect to management of the patient; and (2) notified that he or she may decline to receive medical services by telemedicine and may withdraw from such care at any time.         HPI     Evelia Ramon is a 34 y.o. female with a history of allergies, eczema and possible asthma presenting with chief complaint of RLL pneumonia and intermittent asthma    Interval hx:  CT chest with further improvement in parenchymal RLL findings w/ linear scarring. Rads suggested possible 6 month up for unclear indications  She denies any weight loss.  She feels intermittently breathless since starting to exercise again -- has not run since her PNA    Presenting HPI  # RLL pneumonia  # intermittent asthma    Presented to PCP 1/15/24 note personally reviewed c/b of cough, low grade fever, myalgias, rhinorrhea. CXR performed noted a RLL infiltrate and she was rx augmentin for possible bacterial pneumonia. PCP note 1/20/24 reviewed -- despite abx she developed fever 102F, palpitations, cough, night sweats. CXR 1/20/24 persdonally reviewed with worsening RLL consolidation  and she was sent to  ER. ED note 11/20/24 reviewed noted to be febrile 100.8 and tachycardic.     CT chest 11/20/2024 personally interpreted  -RUL patchy consolidation and bronchiolitis  -RLL dense consolidation  -R hilar/mediastinal, subclarinal ERROL    CXR 12/31/24 with persistent RLL opacity overall improved from prior    Her symptoms have largely resolved. She continues to have intermittent cough productive of green mucus/yellow which is new for her.  She denies any shortness of breath. She does have intermittent sinus symptoms, seasonal allergies twice a year w/ clear/white cough.     She has a history of childhood asthma treated w/ multiple inhalers in childhood and improved after losing weight in young adulthood. She then became symptomatic during pregnancy in her late 20s. Albuterol PRN.  She was using albuterol daily. No acid reflux currently.  She denies dysphagia, coughing w/ meals    She is never smoker, no marijuana,  no vaping.  Exposures: therapist/. No mold or water damage in home/work     Objective   Past History     Past Medical History:  Past Medical History:   Diagnosis Date    Allergy     Anemia     Asthma     Eczema     PCOS (polycystic ovarian syndrome)          Past Surgical History:  Past Surgical History:   Procedure Laterality Date    CERVICAL CERCLAGE      WISDOM TOOTH EXTRACTION           Social History:   Social History     Socioeconomic History    Marital status:     Number of children: 1   Tobacco Use    Smoking status: Never     Passive exposure: Never    Smokeless tobacco: Never   Substance and Sexual Activity    Alcohol use: No    Drug use: No    Sexual activity: Yes     Partners: Male     Birth control/protection: OCP   Social History Narrative    , 18 month old daughter. . Moved here from [place] 2014.      Social Drivers of Health     Financial Resource Strain: Low Risk  (3/31/2025)    Overall Financial Resource Strain ([facility])      Difficulty of Paying Living Expenses: Not hard at all   Food Insecurity: No Food Insecurity (3/31/2025)    Hunger Vital Sign     Worried About Running Out of Food in the Last Year: Never true     Ran Out of Food in the Last Year: Never true   Transportation Needs: No Transportation Needs (3/31/2025)    PRAPARE - Transportation     Lack of Transportation (Medical): No     Lack of Transportation (Non-Medical): No   Physical Activity: Insufficiently Active (3/31/2025)    Exercise Vital Sign     Days of Exercise per Week: 3 days     Minutes of Exercise per Session: 30 min   Stress: No Stress Concern Present (3/31/2025)    [employer] [place] - Occupational Stress Questionnaire     Feeling of Stress : Not at all   Housing Stability: Low Risk  (3/31/2025)    Housing Stability Vital Sign     Unable to Pay for Housing in the Last Year: No     Number of Times Moved in the Last Year: 0     Homeless in the Last Year: No         Family Hx:  No family history of pulmonary fibrosis, pre-mature graying of hair, cirrhosis, or hematologic malignancies  No family history of emphysema or spontaneous PTX  no family history of lung cancer or lymphoma    Allergies and Pertinent Medications   Allergies and Medications Reviewed    Nut - unspecified and Latex  Current Outpatient Medications on File Prior to Visit   Medication Sig Dispense Refill    albuterol (PROAIR HFA) 90 mcg/actuation inhaler Inhale 2 puffs into the lungs every 6 (six) hours as needed for Wheezing. Rescue 18 g 11    budesonide-formoterol 160-4.5 mcg (SYMBICORT) 160-4.5 mcg/actuation HFAA Inhale 2 puffs into the lungs every 12 (twelve) hours as needed (worsening asthma symptoms). Controller 10.2 g 6    EPINEPHrine (EPIPEN 2-DELVIS) 0.3 mg/0.3 mL AtIn Inject 0.3 mLs (0.3 mg total) into the muscle once as needed (Allergic reaction). 2 each 3     Current Facility-Administered Medications on File Prior to Visit   Medication Dose Route Frequency Provider Last  [2 - 4 times a month (2 pts)] : 2-4 times a month (2 points) "Rate Last Admin    levonorgestreL (FLORIDALMA) 14 mcg/24 hrs (3 yrs) 13.5 mg IUD 14 mcg  14 mcg Intrauterine  Katarzyna Thomas MD   14 mcg at 12/20/23 1345   Supplements: probiotic daily, zyrtec           Physical Exam   There were no vitals taken for this visit.      Constitutional: No acute distress, Atraumatic   HEENT: moist mucus membranes, extraocular movements intact  Neurological: normal speech/travis, moves all extremities against gravity  Psych: Appropriate affect, normal mood       Diagnostic Studies      All diagnostic studies relevant to chief complaint reviewed personally    Labs:  Lab Results   Component Value Date    WBC 5.28 12/31/2024    HGB 11.7 (L) 12/31/2024     12/31/2024    MCV 89 12/31/2024     Lab Results   Component Value Date     12/31/2024    K 3.6 12/31/2024    CO2 25 12/31/2024    BUN 10 12/31/2024    CREATININE 0.8 12/31/2024    MG 2.2 11/22/2024     Lab Results   Component Value Date    AST 36 11/20/2024    ALT 28 11/20/2024    ALBUMIN 2.4 (L) 11/22/2024    PROT 9.0 (H) 11/20/2024    BILITOT 0.2 11/20/2024         PFTs:  Pulmonary Functions Testing Results:  No results found for: "FEV1", "FVC", "VXC2KQK", "TLC", "DLCO"      FVC FEV1 FEV/FVC TLC DLCO 6MWT Interpret                                                         TTE:  No results found for this or any previous visit.            Assessment and Plan   Evelia Ramon is a 34 y.o. female  with a history of allergies, eczema and possible asthma presenting with chief complaint of RLL pneumonia and intermittent asthma    Problem List:  # RLL scarring and bronchiectasis following RLL pneumonia - acute, improving - persistent scarring in RLL w/ some mild bronchiectasis. Will clarify indication for repeat ct chest w/ rads. Explained she may be at risk for pneumonia again in this area given bronchiectasis  # intermittent asthma - chronic, stable - continue LINDA PRN -- advised to use 30 minutes before exercise. She was rx rescue " [1 or 2 (0 pts)] : 1 or 2 (0 points) ICS/LABA (symbicort 2 puff bid) to use in the setting of exacerbation. Recommend PCV 20        Differential, diagnoses, diagnostic work up, and possible treatments were discussed with the patient. Questions were answered.    Carolynn Roth MD  Pulmonary-Critical Care Medicine        Return in mid dec      For this visit, the following time was spent  preparing to see the patient (e.g., review of tests) 14  minutes  obtaining and/or reviewing separately obtained history 0 minutes  Performing a medically necessary appropriate examination and/or evaluation 12 minutes  Counseling and educating the patient/family/caregiver 12 minutes  Ordering medications, tests, or procedures 2 minutes  Referring and communicating with other health care professionals (when not reported separately) 0minutes  Documenting clinical information in the electronic or other health record 5minutes  Care coordination (not reported separately) 0minutes    Total time = 45 minutes               [Less than monthly (1 pt)] : Less than monthly (1 point) [1] : 2) Feeling down, depressed, or hopeless for several days (1) [PHQ-2 Positive] : PHQ-2 Positive [1/2 of Days or More (2)] : 7.) Trouble concentrating on things, such as reading a newspaper or watching television? Half the days or more [Nearly Every Day (3)] : 8.) Moving or speaking so slowly that other people could have noticed, or the opposite, moving or speaking faster than usual? Nearly every day [Several Days (1)] : 9.) Thoughts that you would be off dead or of hurting yourself in some way? Several days [Moderately Severe] : Severity of Depression is Moderately Severe [Somewhat Difficult] : How difficult have these problems made it for you to do your work, take care of things at home, or get along with people? Somewhat difficult [Yes] : Yes [No] : In the past 12 months have you used drugs other than those required for medical reasons? No [One fall no injury in past year] : Patient reported one fall in the past year without injury [PHQ-9 Positive] : PHQ-9 Positive [I have developed a follow-up plan documented below in the note.] : I have developed a follow-up plan documented below in the note. [Former] : Former [15-19] : 15-19 [< 15 Years] : < 15 Years [NO] : No [Audit-CScore] : 3 [NBI7Odton] : 2 [[id number]] : 18

## 2025-05-28 ENCOUNTER — PATIENT MESSAGE (OUTPATIENT)
Dept: PULMONOLOGY | Facility: CLINIC | Age: 35
End: 2025-05-28
Payer: COMMERCIAL